# Patient Record
Sex: FEMALE | Race: BLACK OR AFRICAN AMERICAN | NOT HISPANIC OR LATINO | Employment: FULL TIME | URBAN - METROPOLITAN AREA
[De-identification: names, ages, dates, MRNs, and addresses within clinical notes are randomized per-mention and may not be internally consistent; named-entity substitution may affect disease eponyms.]

---

## 2021-01-06 ENCOUNTER — TELEPHONE (OUTPATIENT)
Dept: NEUROLOGY | Facility: CLINIC | Age: 36
End: 2021-01-06

## 2021-01-14 ENCOUNTER — OFFICE VISIT (OUTPATIENT)
Dept: NEUROLOGY | Facility: CLINIC | Age: 36
End: 2021-01-14
Payer: OTHER GOVERNMENT

## 2021-01-14 ENCOUNTER — LAB VISIT (OUTPATIENT)
Dept: LAB | Facility: HOSPITAL | Age: 36
End: 2021-01-14
Attending: PSYCHIATRY & NEUROLOGY
Payer: OTHER GOVERNMENT

## 2021-01-14 VITALS
SYSTOLIC BLOOD PRESSURE: 115 MMHG | BODY MASS INDEX: 33.76 KG/M2 | TEMPERATURE: 97 F | WEIGHT: 227.94 LBS | HEIGHT: 69 IN | DIASTOLIC BLOOD PRESSURE: 79 MMHG | HEART RATE: 77 BPM

## 2021-01-14 DIAGNOSIS — M62.81 MUSCLE WEAKNESS: ICD-10-CM

## 2021-01-14 DIAGNOSIS — M79.10 MYALGIA: ICD-10-CM

## 2021-01-14 DIAGNOSIS — G43.919 INTRACTABLE MIGRAINE WITHOUT STATUS MIGRAINOSUS, UNSPECIFIED MIGRAINE TYPE: ICD-10-CM

## 2021-01-14 DIAGNOSIS — H47.10 OPTIC NERVE EDEMA: ICD-10-CM

## 2021-01-14 DIAGNOSIS — M62.81 MUSCLE WEAKNESS: Primary | ICD-10-CM

## 2021-01-14 LAB
BASOPHILS # BLD AUTO: 0.03 K/UL (ref 0–0.2)
BASOPHILS NFR BLD: 0.5 % (ref 0–1.9)
DIFFERENTIAL METHOD: ABNORMAL
EOSINOPHIL # BLD AUTO: 0.1 K/UL (ref 0–0.5)
EOSINOPHIL NFR BLD: 0.9 % (ref 0–8)
ERYTHROCYTE [DISTWIDTH] IN BLOOD BY AUTOMATED COUNT: 12.5 % (ref 11.5–14.5)
ERYTHROCYTE [SEDIMENTATION RATE] IN BLOOD BY WESTERGREN METHOD: 46 MM/HR (ref 0–20)
HCT VFR BLD AUTO: 44 % (ref 37–48.5)
HGB BLD-MCNC: 13.8 G/DL (ref 12–16)
IMM GRANULOCYTES # BLD AUTO: 0.02 K/UL (ref 0–0.04)
IMM GRANULOCYTES NFR BLD AUTO: 0.4 % (ref 0–0.5)
LYMPHOCYTES # BLD AUTO: 2.1 K/UL (ref 1–4.8)
LYMPHOCYTES NFR BLD: 37.2 % (ref 18–48)
MCH RBC QN AUTO: 31.7 PG (ref 27–31)
MCHC RBC AUTO-ENTMCNC: 31.4 G/DL (ref 32–36)
MCV RBC AUTO: 101 FL (ref 82–98)
MONOCYTES # BLD AUTO: 0.5 K/UL (ref 0.3–1)
MONOCYTES NFR BLD: 7.9 % (ref 4–15)
NEUTROPHILS # BLD AUTO: 3 K/UL (ref 1.8–7.7)
NEUTROPHILS NFR BLD: 53.1 % (ref 38–73)
NRBC BLD-RTO: 0 /100 WBC
PLATELET # BLD AUTO: 339 K/UL (ref 150–350)
PMV BLD AUTO: 10.3 FL (ref 9.2–12.9)
RBC # BLD AUTO: 4.36 M/UL (ref 4–5.4)
WBC # BLD AUTO: 5.67 K/UL (ref 3.9–12.7)

## 2021-01-14 PROCEDURE — 86038 ANTINUCLEAR ANTIBODIES: CPT

## 2021-01-14 PROCEDURE — 84165 PROTEIN E-PHORESIS SERUM: CPT | Mod: 26,,, | Performed by: PATHOLOGY

## 2021-01-14 PROCEDURE — 84165 PATHOLOGIST INTERPRETATION SPE: ICD-10-PCS | Mod: 26,,, | Performed by: PATHOLOGY

## 2021-01-14 PROCEDURE — 85025 COMPLETE CBC W/AUTO DIFF WBC: CPT

## 2021-01-14 PROCEDURE — 99205 PR OFFICE/OUTPT VISIT, NEW, LEVL V, 60-74 MIN: ICD-10-PCS | Mod: S$PBB,,, | Performed by: PSYCHIATRY & NEUROLOGY

## 2021-01-14 PROCEDURE — 82607 VITAMIN B-12: CPT

## 2021-01-14 PROCEDURE — 99999 PR PBB SHADOW E&M-EST. PATIENT-LVL IV: ICD-10-PCS | Mod: PBBFAC,,, | Performed by: PSYCHIATRY & NEUROLOGY

## 2021-01-14 PROCEDURE — 84165 PROTEIN E-PHORESIS SERUM: CPT

## 2021-01-14 PROCEDURE — 84207 ASSAY OF VITAMIN B-6: CPT

## 2021-01-14 PROCEDURE — 36415 COLL VENOUS BLD VENIPUNCTURE: CPT | Mod: PO

## 2021-01-14 PROCEDURE — 82306 VITAMIN D 25 HYDROXY: CPT

## 2021-01-14 PROCEDURE — 84425 ASSAY OF VITAMIN B-1: CPT

## 2021-01-14 PROCEDURE — 82550 ASSAY OF CK (CPK): CPT

## 2021-01-14 PROCEDURE — 99214 OFFICE O/P EST MOD 30 MIN: CPT | Mod: PBBFAC,PN | Performed by: PSYCHIATRY & NEUROLOGY

## 2021-01-14 PROCEDURE — 86255 FLUORESCENT ANTIBODY SCREEN: CPT | Mod: 59

## 2021-01-14 PROCEDURE — 86334 IMMUNOFIX E-PHORESIS SERUM: CPT

## 2021-01-14 PROCEDURE — 82746 ASSAY OF FOLIC ACID SERUM: CPT

## 2021-01-14 PROCEDURE — 86334 PATHOLOGIST INTERPRETATION IFE: ICD-10-PCS | Mod: 26,,, | Performed by: PATHOLOGY

## 2021-01-14 PROCEDURE — 86334 IMMUNOFIX E-PHORESIS SERUM: CPT | Mod: 26,,, | Performed by: PATHOLOGY

## 2021-01-14 PROCEDURE — 99205 OFFICE O/P NEW HI 60 MIN: CPT | Mod: S$PBB,,, | Performed by: PSYCHIATRY & NEUROLOGY

## 2021-01-14 PROCEDURE — 86140 C-REACTIVE PROTEIN: CPT

## 2021-01-14 PROCEDURE — 85651 RBC SED RATE NONAUTOMATED: CPT | Mod: PO

## 2021-01-14 PROCEDURE — 86255 FLUORESCENT ANTIBODY SCREEN: CPT | Mod: 91

## 2021-01-14 PROCEDURE — 83519 RIA NONANTIBODY: CPT | Mod: 59

## 2021-01-14 PROCEDURE — 99999 PR PBB SHADOW E&M-EST. PATIENT-LVL IV: CPT | Mod: PBBFAC,,, | Performed by: PSYCHIATRY & NEUROLOGY

## 2021-01-14 PROCEDURE — 80053 COMPREHEN METABOLIC PANEL: CPT

## 2021-01-14 RX ORDER — LEVETIRACETAM 250 MG/1
250 TABLET ORAL 2 TIMES DAILY
COMMUNITY
Start: 2021-01-05 | End: 2022-03-24

## 2021-01-15 LAB
25(OH)D3+25(OH)D2 SERPL-MCNC: 10 NG/ML (ref 30–96)
ALBUMIN SERPL BCP-MCNC: 3.9 G/DL (ref 3.5–5.2)
ALBUMIN SERPL ELPH-MCNC: 3.92 G/DL (ref 3.35–5.55)
ALP SERPL-CCNC: 105 U/L (ref 55–135)
ALPHA1 GLOB SERPL ELPH-MCNC: 0.4 G/DL (ref 0.17–0.41)
ALPHA2 GLOB SERPL ELPH-MCNC: 1.09 G/DL (ref 0.43–0.99)
ALT SERPL W/O P-5'-P-CCNC: 16 U/L (ref 10–44)
ANA SER QL IF: NORMAL
ANION GAP SERPL CALC-SCNC: 12 MMOL/L (ref 8–16)
AST SERPL-CCNC: 23 U/L (ref 10–40)
B-GLOBULIN SERPL ELPH-MCNC: 1.05 G/DL (ref 0.5–1.1)
BILIRUB SERPL-MCNC: 0.4 MG/DL (ref 0.1–1)
BUN SERPL-MCNC: 8 MG/DL (ref 6–20)
CALCIUM SERPL-MCNC: 9 MG/DL (ref 8.7–10.5)
CHLORIDE SERPL-SCNC: 103 MMOL/L (ref 95–110)
CK SERPL-CCNC: 100 U/L (ref 20–180)
CO2 SERPL-SCNC: 24 MMOL/L (ref 23–29)
CREAT SERPL-MCNC: 0.8 MG/DL (ref 0.5–1.4)
CRP SERPL-MCNC: 10.4 MG/L (ref 0–8.2)
EST. GFR  (AFRICAN AMERICAN): >60 ML/MIN/1.73 M^2
EST. GFR  (NON AFRICAN AMERICAN): >60 ML/MIN/1.73 M^2
FOLATE SERPL-MCNC: 9.1 NG/ML (ref 4–24)
GAMMA GLOB SERPL ELPH-MCNC: 1.85 G/DL (ref 0.67–1.58)
GLUCOSE SERPL-MCNC: 83 MG/DL (ref 70–110)
INTERPRETATION SERPL IFE-IMP: NORMAL
PATHOLOGIST INTERPRETATION IFE: NORMAL
PATHOLOGIST INTERPRETATION SPE: NORMAL
POTASSIUM SERPL-SCNC: 4.1 MMOL/L (ref 3.5–5.1)
PROT SERPL-MCNC: 8.3 G/DL (ref 6–8.4)
PROT SERPL-MCNC: 8.6 G/DL (ref 6–8.4)
SODIUM SERPL-SCNC: 139 MMOL/L (ref 136–145)
VIT B12 SERPL-MCNC: 617 PG/ML (ref 210–950)

## 2021-01-18 LAB
ANCA AB TITR SER IF: NORMAL TITER
P-ANCA TITR SER IF: NORMAL TITER

## 2021-01-19 ENCOUNTER — PATIENT MESSAGE (OUTPATIENT)
Dept: NEUROLOGY | Facility: CLINIC | Age: 36
End: 2021-01-19

## 2021-01-19 LAB
PYRIDOXAL SERPL-MCNC: 29 UG/L (ref 5–50)
VIT B1 BLD-MCNC: 41 UG/L (ref 38–122)

## 2021-01-26 LAB
AMPHIPHYSIN AB TITR SER: NEGATIVE TITER
CV2 IGG TITR SER: NEGATIVE TITER
GLIAL NUC TYPE 1 AB TITR SER: NEGATIVE TITER
HU1 AB TITR SER: NEGATIVE TITER
HU2 AB TITR SER IF: NEGATIVE TITER
HU3 AB TITR SER: NEGATIVE TITER
IMMUNOLOGIST REVIEW: ABNORMAL
NACHR AB SER-SCNC: 0.01 NMOL/L
PAVAL REFLEX TEST ADDED: ABNORMAL
PCA-1 AB TITR SER: NEGATIVE TITER
PCA-2 AB TITR SER: NEGATIVE TITER
PCA-TR AB TITR SER: NEGATIVE TITER
STRIA MUS AB TITR SER: NEGATIVE TITER
VGCC-N BIND AB SER-SCNC: 0.02 NMOL/L
VGCC-P/Q BIND AB SER-SCNC: 0.04 NMOL/L
VGKC AB SER-SCNC: 0 NMOL/L

## 2021-01-29 ENCOUNTER — PROCEDURE VISIT (OUTPATIENT)
Dept: NEUROLOGY | Facility: CLINIC | Age: 36
End: 2021-01-29
Payer: OTHER GOVERNMENT

## 2021-01-29 DIAGNOSIS — M62.81 MUSCLE WEAKNESS: ICD-10-CM

## 2021-01-29 DIAGNOSIS — M79.10 MYALGIA: ICD-10-CM

## 2021-01-29 PROCEDURE — 95910 NRV CNDJ TEST 7-8 STUDIES: CPT | Mod: PBBFAC,PO | Performed by: PSYCHIATRY & NEUROLOGY

## 2021-01-29 PROCEDURE — 95937 NEUROMUSCULAR JUNCTION TEST: CPT | Mod: PBBFAC,PO | Performed by: PSYCHIATRY & NEUROLOGY

## 2021-01-29 PROCEDURE — 95886 MUSC TEST DONE W/N TEST COMP: CPT | Mod: PBBFAC,PO | Performed by: PSYCHIATRY & NEUROLOGY

## 2021-01-29 PROCEDURE — 95910 PR NERVE CONDUCTION STUDY; 7-8 STUDIES: ICD-10-PCS | Mod: 26,S$PBB,, | Performed by: PSYCHIATRY & NEUROLOGY

## 2021-01-29 PROCEDURE — 95886 PR EMG COMPLETE, W/ NERVE CONDUCTION STUDIES, 5+ MUSCLES: ICD-10-PCS | Mod: 26,S$PBB,, | Performed by: PSYCHIATRY & NEUROLOGY

## 2021-01-29 PROCEDURE — 95910 NRV CNDJ TEST 7-8 STUDIES: CPT | Mod: 26,S$PBB,, | Performed by: PSYCHIATRY & NEUROLOGY

## 2021-01-29 PROCEDURE — 95937 PR NEUROMUSCULAR JUNCTION TEST: ICD-10-PCS | Mod: 26,S$PBB,, | Performed by: PSYCHIATRY & NEUROLOGY

## 2021-01-29 PROCEDURE — 95886 MUSC TEST DONE W/N TEST COMP: CPT | Mod: 26,S$PBB,, | Performed by: PSYCHIATRY & NEUROLOGY

## 2021-01-29 PROCEDURE — 95937 NEUROMUSCULAR JUNCTION TEST: CPT | Mod: 26,S$PBB,, | Performed by: PSYCHIATRY & NEUROLOGY

## 2021-02-12 ENCOUNTER — PATIENT MESSAGE (OUTPATIENT)
Dept: NEUROLOGY | Facility: CLINIC | Age: 36
End: 2021-02-12

## 2021-02-23 ENCOUNTER — PATIENT MESSAGE (OUTPATIENT)
Dept: NEUROLOGY | Facility: CLINIC | Age: 36
End: 2021-02-23

## 2021-03-31 ENCOUNTER — LAB VISIT (OUTPATIENT)
Dept: LAB | Facility: HOSPITAL | Age: 36
End: 2021-03-31
Attending: PSYCHIATRY & NEUROLOGY
Payer: OTHER GOVERNMENT

## 2021-03-31 ENCOUNTER — OFFICE VISIT (OUTPATIENT)
Dept: NEUROLOGY | Facility: CLINIC | Age: 36
End: 2021-03-31
Payer: OTHER GOVERNMENT

## 2021-03-31 VITALS
DIASTOLIC BLOOD PRESSURE: 75 MMHG | HEIGHT: 69 IN | HEART RATE: 79 BPM | BODY MASS INDEX: 33.46 KG/M2 | SYSTOLIC BLOOD PRESSURE: 106 MMHG | WEIGHT: 225.88 LBS

## 2021-03-31 DIAGNOSIS — M79.10 MYALGIA: ICD-10-CM

## 2021-03-31 DIAGNOSIS — M79.10 MYALGIA: Primary | ICD-10-CM

## 2021-03-31 LAB — ERYTHROCYTE [SEDIMENTATION RATE] IN BLOOD BY WESTERGREN METHOD: 59 MM/HR (ref 0–20)

## 2021-03-31 PROCEDURE — 99999 PR PBB SHADOW E&M-EST. PATIENT-LVL III: ICD-10-PCS | Mod: PBBFAC,,, | Performed by: PSYCHIATRY & NEUROLOGY

## 2021-03-31 PROCEDURE — 83516 IMMUNOASSAY NONANTIBODY: CPT | Mod: 59 | Performed by: PSYCHIATRY & NEUROLOGY

## 2021-03-31 PROCEDURE — 36415 COLL VENOUS BLD VENIPUNCTURE: CPT | Mod: PO | Performed by: PSYCHIATRY & NEUROLOGY

## 2021-03-31 PROCEDURE — 99214 OFFICE O/P EST MOD 30 MIN: CPT | Mod: S$PBB,,, | Performed by: PSYCHIATRY & NEUROLOGY

## 2021-03-31 PROCEDURE — 86140 C-REACTIVE PROTEIN: CPT | Performed by: PSYCHIATRY & NEUROLOGY

## 2021-03-31 PROCEDURE — 99214 PR OFFICE/OUTPT VISIT, EST, LEVL IV, 30-39 MIN: ICD-10-PCS | Mod: S$PBB,,, | Performed by: PSYCHIATRY & NEUROLOGY

## 2021-03-31 PROCEDURE — 99999 PR PBB SHADOW E&M-EST. PATIENT-LVL III: CPT | Mod: PBBFAC,,, | Performed by: PSYCHIATRY & NEUROLOGY

## 2021-03-31 PROCEDURE — 99213 OFFICE O/P EST LOW 20 MIN: CPT | Mod: PBBFAC,PO | Performed by: PSYCHIATRY & NEUROLOGY

## 2021-03-31 PROCEDURE — 82550 ASSAY OF CK (CPK): CPT | Performed by: PSYCHIATRY & NEUROLOGY

## 2021-03-31 PROCEDURE — 82085 ASSAY OF ALDOLASE: CPT | Performed by: PSYCHIATRY & NEUROLOGY

## 2021-03-31 PROCEDURE — 83519 RIA NONANTIBODY: CPT | Performed by: PSYCHIATRY & NEUROLOGY

## 2021-03-31 PROCEDURE — 85651 RBC SED RATE NONAUTOMATED: CPT | Mod: PO | Performed by: PSYCHIATRY & NEUROLOGY

## 2021-03-31 RX ORDER — SUMATRIPTAN 50 MG/1
50 TABLET, FILM COATED ORAL
COMMUNITY
Start: 2021-03-24 | End: 2022-03-24

## 2021-04-01 LAB
CK SERPL-CCNC: 360 U/L (ref 20–180)
CRP SERPL-MCNC: 10.5 MG/L (ref 0–8.2)

## 2021-04-02 LAB — ALDOLASE SERPL-CCNC: 5.1 U/L (ref 1.2–7.6)

## 2021-04-10 LAB
EJ AB SER QL: NOT DETECTED
ENA JO1 AB SER IA-ACNC: NORMAL AI
KU AB SER QL: NOT DETECTED
MI2 AB SER QL: NOT DETECTED
OJ AB SER QL: NOT DETECTED
PL12 AB SER QL: NOT DETECTED
PL7 AB SER QL: NOT DETECTED
SRP AB SERPL QL: NOT DETECTED

## 2021-04-22 LAB — MAYO MISCELLANEOUS RESULT (REF): NORMAL

## 2021-04-27 ENCOUNTER — PATIENT MESSAGE (OUTPATIENT)
Dept: NEUROLOGY | Facility: CLINIC | Age: 36
End: 2021-04-27

## 2021-05-04 ENCOUNTER — PATIENT MESSAGE (OUTPATIENT)
Dept: NEUROLOGY | Facility: CLINIC | Age: 36
End: 2021-05-04

## 2021-05-17 ENCOUNTER — TELEPHONE (OUTPATIENT)
Dept: NEUROLOGY | Facility: CLINIC | Age: 36
End: 2021-05-17

## 2021-05-20 ENCOUNTER — TELEPHONE (OUTPATIENT)
Dept: NEUROLOGY | Facility: CLINIC | Age: 36
End: 2021-05-20

## 2021-07-01 ENCOUNTER — PATIENT MESSAGE (OUTPATIENT)
Dept: NEUROLOGY | Facility: CLINIC | Age: 36
End: 2021-07-01

## 2021-11-18 ENCOUNTER — LAB VISIT (OUTPATIENT)
Dept: LAB | Facility: HOSPITAL | Age: 36
End: 2021-11-18
Attending: INTERNAL MEDICINE
Payer: OTHER GOVERNMENT

## 2021-11-18 ENCOUNTER — OFFICE VISIT (OUTPATIENT)
Dept: RHEUMATOLOGY | Facility: CLINIC | Age: 36
End: 2021-11-18
Payer: OTHER GOVERNMENT

## 2021-11-18 VITALS
HEART RATE: 66 BPM | RESPIRATION RATE: 18 BRPM | TEMPERATURE: 98 F | OXYGEN SATURATION: 98 % | SYSTOLIC BLOOD PRESSURE: 120 MMHG | DIASTOLIC BLOOD PRESSURE: 79 MMHG

## 2021-11-18 DIAGNOSIS — M79.7 FIBROMYALGIA: ICD-10-CM

## 2021-11-18 DIAGNOSIS — R76.8 POSITIVE ANA (ANTINUCLEAR ANTIBODY): Primary | ICD-10-CM

## 2021-11-18 DIAGNOSIS — Z71.89 COUNSELING AND COORDINATION OF CARE: ICD-10-CM

## 2021-11-18 DIAGNOSIS — R76.8 POSITIVE ANA (ANTINUCLEAR ANTIBODY): ICD-10-CM

## 2021-11-18 DIAGNOSIS — E66.9 CLASS 1 OBESITY IN ADULT, UNSPECIFIED BMI, UNSPECIFIED OBESITY TYPE, UNSPECIFIED WHETHER SERIOUS COMORBIDITY PRESENT: ICD-10-CM

## 2021-11-18 LAB
25(OH)D3+25(OH)D2 SERPL-MCNC: 19 NG/ML (ref 30–96)
ALBUMIN SERPL BCP-MCNC: 3.7 G/DL (ref 3.5–5.2)
ALP SERPL-CCNC: 87 U/L (ref 55–135)
ALT SERPL W/O P-5'-P-CCNC: 18 U/L (ref 10–44)
ANION GAP SERPL CALC-SCNC: 9 MMOL/L (ref 8–16)
AST SERPL-CCNC: 26 U/L (ref 10–40)
BASOPHILS # BLD AUTO: 0.02 K/UL (ref 0–0.2)
BASOPHILS NFR BLD: 0.4 % (ref 0–1.9)
BILIRUB SERPL-MCNC: 0.4 MG/DL (ref 0.1–1)
BUN SERPL-MCNC: 7 MG/DL (ref 6–20)
C3 SERPL-MCNC: 150 MG/DL (ref 50–180)
C4 SERPL-MCNC: 30 MG/DL (ref 11–44)
CALCIUM SERPL-MCNC: 9.4 MG/DL (ref 8.7–10.5)
CCP AB SER IA-ACNC: <0.5 U/ML
CHLORIDE SERPL-SCNC: 103 MMOL/L (ref 95–110)
CK SERPL-CCNC: 95 U/L (ref 20–180)
CO2 SERPL-SCNC: 25 MMOL/L (ref 23–29)
CREAT SERPL-MCNC: 0.8 MG/DL (ref 0.5–1.4)
CRP SERPL-MCNC: 5.3 MG/L (ref 0–8.2)
DIFFERENTIAL METHOD: ABNORMAL
EOSINOPHIL # BLD AUTO: 0.1 K/UL (ref 0–0.5)
EOSINOPHIL NFR BLD: 0.9 % (ref 0–8)
ERYTHROCYTE [DISTWIDTH] IN BLOOD BY AUTOMATED COUNT: 12.5 % (ref 11.5–14.5)
ERYTHROCYTE [SEDIMENTATION RATE] IN BLOOD BY WESTERGREN METHOD: 45 MM/HR (ref 0–36)
EST. GFR  (AFRICAN AMERICAN): >60 ML/MIN/1.73 M^2
EST. GFR  (NON AFRICAN AMERICAN): >60 ML/MIN/1.73 M^2
GLUCOSE SERPL-MCNC: 70 MG/DL (ref 70–110)
HCT VFR BLD AUTO: 42.6 % (ref 37–48.5)
HGB BLD-MCNC: 13.5 G/DL (ref 12–16)
IMM GRANULOCYTES # BLD AUTO: 0.01 K/UL (ref 0–0.04)
IMM GRANULOCYTES NFR BLD AUTO: 0.2 % (ref 0–0.5)
LDH SERPL L TO P-CCNC: 197 U/L (ref 110–260)
LYMPHOCYTES # BLD AUTO: 2.1 K/UL (ref 1–4.8)
LYMPHOCYTES NFR BLD: 40.3 % (ref 18–48)
MCH RBC QN AUTO: 31.2 PG (ref 27–31)
MCHC RBC AUTO-ENTMCNC: 31.7 G/DL (ref 32–36)
MCV RBC AUTO: 98 FL (ref 82–98)
MONOCYTES # BLD AUTO: 0.4 K/UL (ref 0.3–1)
MONOCYTES NFR BLD: 8.3 % (ref 4–15)
NEUTROPHILS # BLD AUTO: 2.7 K/UL (ref 1.8–7.7)
NEUTROPHILS NFR BLD: 49.9 % (ref 38–73)
NRBC BLD-RTO: 0 /100 WBC
PLATELET # BLD AUTO: 337 K/UL (ref 150–450)
PMV BLD AUTO: 10.4 FL (ref 9.2–12.9)
POTASSIUM SERPL-SCNC: 3.5 MMOL/L (ref 3.5–5.1)
PROT SERPL-MCNC: 8.6 G/DL (ref 6–8.4)
RBC # BLD AUTO: 4.33 M/UL (ref 4–5.4)
RHEUMATOID FACT SERPL-ACNC: <13 IU/ML (ref 0–15)
SODIUM SERPL-SCNC: 137 MMOL/L (ref 136–145)
TSH SERPL DL<=0.005 MIU/L-ACNC: 1.11 UIU/ML (ref 0.4–4)
WBC # BLD AUTO: 5.31 K/UL (ref 3.9–12.7)

## 2021-11-18 PROCEDURE — 99213 OFFICE O/P EST LOW 20 MIN: CPT | Mod: PBBFAC,PO | Performed by: INTERNAL MEDICINE

## 2021-11-18 PROCEDURE — 83615 LACTATE (LD) (LDH) ENZYME: CPT | Performed by: INTERNAL MEDICINE

## 2021-11-18 PROCEDURE — 82306 VITAMIN D 25 HYDROXY: CPT | Performed by: INTERNAL MEDICINE

## 2021-11-18 PROCEDURE — 82550 ASSAY OF CK (CPK): CPT | Performed by: INTERNAL MEDICINE

## 2021-11-18 PROCEDURE — 86235 NUCLEAR ANTIGEN ANTIBODY: CPT | Mod: 59 | Performed by: INTERNAL MEDICINE

## 2021-11-18 PROCEDURE — 85025 COMPLETE CBC W/AUTO DIFF WBC: CPT | Performed by: INTERNAL MEDICINE

## 2021-11-18 PROCEDURE — 86038 ANTINUCLEAR ANTIBODIES: CPT | Performed by: INTERNAL MEDICINE

## 2021-11-18 PROCEDURE — 82085 ASSAY OF ALDOLASE: CPT | Performed by: INTERNAL MEDICINE

## 2021-11-18 PROCEDURE — 86235 NUCLEAR ANTIGEN ANTIBODY: CPT | Performed by: INTERNAL MEDICINE

## 2021-11-18 PROCEDURE — 86160 COMPLEMENT ANTIGEN: CPT | Mod: 59 | Performed by: INTERNAL MEDICINE

## 2021-11-18 PROCEDURE — 83516 IMMUNOASSAY NONANTIBODY: CPT | Mod: 59 | Performed by: INTERNAL MEDICINE

## 2021-11-18 PROCEDURE — 87340 HEPATITIS B SURFACE AG IA: CPT | Performed by: INTERNAL MEDICINE

## 2021-11-18 PROCEDURE — 86706 HEP B SURFACE ANTIBODY: CPT | Performed by: INTERNAL MEDICINE

## 2021-11-18 PROCEDURE — 86200 CCP ANTIBODY: CPT | Performed by: INTERNAL MEDICINE

## 2021-11-18 PROCEDURE — 99204 PR OFFICE/OUTPT VISIT, NEW, LEVL IV, 45-59 MIN: ICD-10-PCS | Mod: S$PBB,,, | Performed by: INTERNAL MEDICINE

## 2021-11-18 PROCEDURE — 85652 RBC SED RATE AUTOMATED: CPT | Performed by: INTERNAL MEDICINE

## 2021-11-18 PROCEDURE — 80053 COMPREHEN METABOLIC PANEL: CPT | Performed by: INTERNAL MEDICINE

## 2021-11-18 PROCEDURE — 87389 HIV-1 AG W/HIV-1&-2 AB AG IA: CPT | Performed by: INTERNAL MEDICINE

## 2021-11-18 PROCEDURE — 83520 IMMUNOASSAY QUANT NOS NONAB: CPT | Mod: 59 | Performed by: INTERNAL MEDICINE

## 2021-11-18 PROCEDURE — 86431 RHEUMATOID FACTOR QUANT: CPT | Performed by: INTERNAL MEDICINE

## 2021-11-18 PROCEDURE — 99204 OFFICE O/P NEW MOD 45 MIN: CPT | Mod: S$PBB,,, | Performed by: INTERNAL MEDICINE

## 2021-11-18 PROCEDURE — 99999 PR PBB SHADOW E&M-EST. PATIENT-LVL III: CPT | Mod: PBBFAC,,, | Performed by: INTERNAL MEDICINE

## 2021-11-18 PROCEDURE — 86160 COMPLEMENT ANTIGEN: CPT | Performed by: INTERNAL MEDICINE

## 2021-11-18 PROCEDURE — 99999 PR PBB SHADOW E&M-EST. PATIENT-LVL III: ICD-10-PCS | Mod: PBBFAC,,, | Performed by: INTERNAL MEDICINE

## 2021-11-18 PROCEDURE — 86803 HEPATITIS C AB TEST: CPT | Performed by: INTERNAL MEDICINE

## 2021-11-18 PROCEDURE — 84443 ASSAY THYROID STIM HORMONE: CPT | Performed by: INTERNAL MEDICINE

## 2021-11-18 PROCEDURE — 86140 C-REACTIVE PROTEIN: CPT | Performed by: INTERNAL MEDICINE

## 2021-11-19 LAB
ALDOLASE SERPL-CCNC: 2.8 U/L (ref 1.2–7.6)
ANA SER QL IF: NORMAL
HBV SURFACE AB SER-ACNC: POSITIVE M[IU]/ML
HBV SURFACE AG SERPL QL IA: NEGATIVE
HCV AB SERPL QL IA: NEGATIVE
HIV 1+2 AB+HIV1 P24 AG SERPL QL IA: NEGATIVE

## 2021-11-22 LAB
ANTI-SSA ANTIBODY: 0.25 RATIO (ref 0–0.99)
ANTI-SSA INTERPRETATION: NEGATIVE
ANTI-SSB ANTIBODY: 0.29 RATIO (ref 0–0.99)
ANTI-SSB INTERPRETATION: NEGATIVE
ENA SCL70 AB SER-ACNC: 8 UNITS
HISTONE IGG SER IA-ACNC: 2 UNITS (ref 0–0.9)

## 2021-12-02 LAB
ANTI-PM/SCL AB: 51 UNITS
ANTI-SS-A 52 KD AB, IGG: <20 UNITS
EJ AB SER QL: NEGATIVE
ENA JO1 AB SER IA-ACNC: <20 UNITS
ENA SM+RNP AB SER IA-ACNC: <20 UNITS
FIBRILLARIN (U3 RNP): NEGATIVE
KU AB SER QL: NEGATIVE
MDA-5 (P140): <20 UNITS
MI2 AB SER QL: NEGATIVE
NXP-2 (P140): <20 UNITS
OJ AB SER QL: NEGATIVE
PL12 AB SER QL: NEGATIVE
PL7 AB SER QL: NEGATIVE
SRP AB SERPL QL: NEGATIVE
TIF1 GAMMA (P155/140): <20 UNITS
U2 SNRNP: NEGATIVE

## 2021-12-16 ENCOUNTER — OFFICE VISIT (OUTPATIENT)
Dept: RHEUMATOLOGY | Facility: CLINIC | Age: 36
End: 2021-12-16
Payer: OTHER GOVERNMENT

## 2021-12-16 DIAGNOSIS — Z71.89 COUNSELING AND COORDINATION OF CARE: ICD-10-CM

## 2021-12-16 DIAGNOSIS — M79.7 FIBROMYALGIA: ICD-10-CM

## 2021-12-16 DIAGNOSIS — R76.9 ABNORMAL IMMUNOLOGICAL FINDING IN SERUM: Primary | ICD-10-CM

## 2021-12-16 DIAGNOSIS — Z79.899 ENCOUNTER FOR LONG-TERM (CURRENT) USE OF OTHER MEDICATIONS: ICD-10-CM

## 2021-12-16 DIAGNOSIS — E66.9 CLASS 1 OBESITY IN ADULT, UNSPECIFIED BMI, UNSPECIFIED OBESITY TYPE, UNSPECIFIED WHETHER SERIOUS COMORBIDITY PRESENT: ICD-10-CM

## 2021-12-16 PROCEDURE — 99214 PR OFFICE/OUTPT VISIT, EST, LEVL IV, 30-39 MIN: ICD-10-PCS | Mod: 95,,, | Performed by: INTERNAL MEDICINE

## 2021-12-16 PROCEDURE — 99214 OFFICE O/P EST MOD 30 MIN: CPT | Mod: 95,,, | Performed by: INTERNAL MEDICINE

## 2021-12-16 RX ORDER — METHYLPREDNISOLONE 4 MG/1
TABLET ORAL
Qty: 1 EACH | Refills: 0 | Status: SHIPPED | OUTPATIENT
Start: 2021-12-16 | End: 2022-02-18

## 2021-12-16 RX ORDER — HYDROXYCHLOROQUINE SULFATE 200 MG/1
200 TABLET, FILM COATED ORAL 2 TIMES DAILY
Qty: 60 TABLET | Refills: 6 | Status: SHIPPED | OUTPATIENT
Start: 2021-12-16 | End: 2022-05-09 | Stop reason: SDUPTHER

## 2022-01-25 ENCOUNTER — PATIENT MESSAGE (OUTPATIENT)
Dept: RHEUMATOLOGY | Facility: CLINIC | Age: 37
End: 2022-01-25
Payer: OTHER GOVERNMENT

## 2022-01-27 ENCOUNTER — PATIENT MESSAGE (OUTPATIENT)
Dept: RHEUMATOLOGY | Facility: CLINIC | Age: 37
End: 2022-01-27
Payer: OTHER GOVERNMENT

## 2022-02-17 ENCOUNTER — PATIENT MESSAGE (OUTPATIENT)
Dept: RHEUMATOLOGY | Facility: CLINIC | Age: 37
End: 2022-02-17
Payer: OTHER GOVERNMENT

## 2022-02-18 ENCOUNTER — PATIENT MESSAGE (OUTPATIENT)
Dept: RHEUMATOLOGY | Facility: CLINIC | Age: 37
End: 2022-02-18
Payer: OTHER GOVERNMENT

## 2022-02-18 DIAGNOSIS — M60.9 MYOSITIS, UNSPECIFIED MYOSITIS TYPE, UNSPECIFIED SITE: Primary | ICD-10-CM

## 2022-02-18 RX ORDER — METHYLPREDNISOLONE 4 MG/1
TABLET ORAL
Qty: 1 EACH | Refills: 0 | Status: SHIPPED | OUTPATIENT
Start: 2022-02-18 | End: 2022-11-14 | Stop reason: ALTCHOICE

## 2022-03-02 ENCOUNTER — LAB VISIT (OUTPATIENT)
Dept: LAB | Facility: CLINIC | Age: 37
End: 2022-03-02
Payer: OTHER GOVERNMENT

## 2022-03-02 DIAGNOSIS — M60.9 MYOSITIS, UNSPECIFIED MYOSITIS TYPE, UNSPECIFIED SITE: ICD-10-CM

## 2022-03-02 LAB
ALBUMIN SERPL BCP-MCNC: 3.3 G/DL (ref 3.5–5.2)
ALP SERPL-CCNC: 87 U/L (ref 55–135)
ALT SERPL W/O P-5'-P-CCNC: 20 U/L (ref 10–44)
ANION GAP SERPL CALC-SCNC: 11 MMOL/L (ref 8–16)
AST SERPL-CCNC: 27 U/L (ref 10–40)
BASOPHILS # BLD AUTO: 0.04 K/UL (ref 0–0.2)
BASOPHILS NFR BLD: 0.7 % (ref 0–1.9)
BILIRUB SERPL-MCNC: 0.4 MG/DL (ref 0.1–1)
BUN SERPL-MCNC: 8 MG/DL (ref 6–20)
CALCIUM SERPL-MCNC: 8.6 MG/DL (ref 8.7–10.5)
CHLORIDE SERPL-SCNC: 103 MMOL/L (ref 95–110)
CK SERPL-CCNC: 107 U/L (ref 20–180)
CO2 SERPL-SCNC: 23 MMOL/L (ref 23–29)
CREAT SERPL-MCNC: 0.8 MG/DL (ref 0.5–1.4)
CRP SERPL-MCNC: 22.4 MG/L (ref 0–8.2)
DIFFERENTIAL METHOD: ABNORMAL
EOSINOPHIL # BLD AUTO: 0.2 K/UL (ref 0–0.5)
EOSINOPHIL NFR BLD: 2.8 % (ref 0–8)
ERYTHROCYTE [DISTWIDTH] IN BLOOD BY AUTOMATED COUNT: 12.6 % (ref 11.5–14.5)
ERYTHROCYTE [SEDIMENTATION RATE] IN BLOOD BY WESTERGREN METHOD: 61 MM/HR (ref 0–20)
EST. GFR  (AFRICAN AMERICAN): >60 ML/MIN/1.73 M^2
EST. GFR  (NON AFRICAN AMERICAN): >60 ML/MIN/1.73 M^2
GLUCOSE SERPL-MCNC: 94 MG/DL (ref 70–110)
HCT VFR BLD AUTO: 38.8 % (ref 37–48.5)
HGB BLD-MCNC: 12.9 G/DL (ref 12–16)
IMM GRANULOCYTES # BLD AUTO: 0.02 K/UL (ref 0–0.04)
IMM GRANULOCYTES NFR BLD AUTO: 0.3 % (ref 0–0.5)
LYMPHOCYTES # BLD AUTO: 1.8 K/UL (ref 1–4.8)
LYMPHOCYTES NFR BLD: 31.9 % (ref 18–48)
MCH RBC QN AUTO: 31.6 PG (ref 27–31)
MCHC RBC AUTO-ENTMCNC: 33.2 G/DL (ref 32–36)
MCV RBC AUTO: 95 FL (ref 82–98)
MONOCYTES # BLD AUTO: 0.6 K/UL (ref 0.3–1)
MONOCYTES NFR BLD: 10.5 % (ref 4–15)
NEUTROPHILS # BLD AUTO: 3.1 K/UL (ref 1.8–7.7)
NEUTROPHILS NFR BLD: 53.8 % (ref 38–73)
NRBC BLD-RTO: 0 /100 WBC
PLATELET # BLD AUTO: 308 K/UL (ref 150–450)
PMV BLD AUTO: 9.9 FL (ref 9.2–12.9)
POTASSIUM SERPL-SCNC: 4.2 MMOL/L (ref 3.5–5.1)
PROT SERPL-MCNC: 7.3 G/DL (ref 6–8.4)
RBC # BLD AUTO: 4.08 M/UL (ref 4–5.4)
SODIUM SERPL-SCNC: 137 MMOL/L (ref 136–145)
TSH SERPL DL<=0.005 MIU/L-ACNC: 1.78 UIU/ML (ref 0.4–4)
WBC # BLD AUTO: 5.74 K/UL (ref 3.9–12.7)

## 2022-03-02 PROCEDURE — 82550 ASSAY OF CK (CPK): CPT | Performed by: INTERNAL MEDICINE

## 2022-03-02 PROCEDURE — 84443 ASSAY THYROID STIM HORMONE: CPT | Performed by: INTERNAL MEDICINE

## 2022-03-02 PROCEDURE — 80053 COMPREHEN METABOLIC PANEL: CPT | Performed by: INTERNAL MEDICINE

## 2022-03-02 PROCEDURE — 36415 COLL VENOUS BLD VENIPUNCTURE: CPT | Mod: ,,, | Performed by: INTERNAL MEDICINE

## 2022-03-02 PROCEDURE — 82085 ASSAY OF ALDOLASE: CPT | Performed by: INTERNAL MEDICINE

## 2022-03-02 PROCEDURE — 86140 C-REACTIVE PROTEIN: CPT | Performed by: INTERNAL MEDICINE

## 2022-03-02 PROCEDURE — 36415 PR COLLECTION VENOUS BLOOD,VENIPUNCTURE: ICD-10-PCS | Mod: ,,, | Performed by: INTERNAL MEDICINE

## 2022-03-02 PROCEDURE — 85025 COMPLETE CBC W/AUTO DIFF WBC: CPT | Performed by: INTERNAL MEDICINE

## 2022-03-02 PROCEDURE — 85651 RBC SED RATE NONAUTOMATED: CPT | Performed by: INTERNAL MEDICINE

## 2022-03-04 ENCOUNTER — PATIENT MESSAGE (OUTPATIENT)
Dept: RHEUMATOLOGY | Facility: CLINIC | Age: 37
End: 2022-03-04
Payer: OTHER GOVERNMENT

## 2022-03-07 LAB — ALDOLASE SERPL-CCNC: 3.5 U/L (ref 1.2–7.6)

## 2022-03-07 RX ORDER — OXYCODONE AND ACETAMINOPHEN 5; 325 MG/1; MG/1
1 TABLET ORAL EVERY 4 HOURS PRN
Qty: 20 TABLET | Refills: 0 | Status: SHIPPED | OUTPATIENT
Start: 2022-03-07 | End: 2022-08-03

## 2022-03-24 ENCOUNTER — OFFICE VISIT (OUTPATIENT)
Dept: RHEUMATOLOGY | Facility: CLINIC | Age: 37
End: 2022-03-24
Payer: OTHER GOVERNMENT

## 2022-03-24 VITALS
WEIGHT: 246.25 LBS | SYSTOLIC BLOOD PRESSURE: 113 MMHG | HEART RATE: 80 BPM | BODY MASS INDEX: 36.37 KG/M2 | DIASTOLIC BLOOD PRESSURE: 74 MMHG

## 2022-03-24 DIAGNOSIS — R76.8 SCL-70 ANTIBODY POSITIVE: Primary | ICD-10-CM

## 2022-03-24 DIAGNOSIS — Z79.52 LONG TERM SYSTEMIC STEROID USER: ICD-10-CM

## 2022-03-24 DIAGNOSIS — Z71.89 COUNSELING AND COORDINATION OF CARE: ICD-10-CM

## 2022-03-24 DIAGNOSIS — E66.9 CLASS 2 OBESITY WITH BODY MASS INDEX (BMI) OF 36.0 TO 36.9 IN ADULT, UNSPECIFIED OBESITY TYPE, UNSPECIFIED WHETHER SERIOUS COMORBIDITY PRESENT: ICD-10-CM

## 2022-03-24 DIAGNOSIS — M79.7 FIBROMYALGIA: ICD-10-CM

## 2022-03-24 DIAGNOSIS — R70.0 ELEVATED SED RATE: ICD-10-CM

## 2022-03-24 PROCEDURE — 99999 PR PBB SHADOW E&M-EST. PATIENT-LVL III: CPT | Mod: PBBFAC,,, | Performed by: INTERNAL MEDICINE

## 2022-03-24 PROCEDURE — 99214 PR OFFICE/OUTPT VISIT, EST, LEVL IV, 30-39 MIN: ICD-10-PCS | Mod: S$PBB,,, | Performed by: INTERNAL MEDICINE

## 2022-03-24 PROCEDURE — 99999 PR PBB SHADOW E&M-EST. PATIENT-LVL III: ICD-10-PCS | Mod: PBBFAC,,, | Performed by: INTERNAL MEDICINE

## 2022-03-24 PROCEDURE — 99213 OFFICE O/P EST LOW 20 MIN: CPT | Mod: PBBFAC,PO | Performed by: INTERNAL MEDICINE

## 2022-03-24 PROCEDURE — 99214 OFFICE O/P EST MOD 30 MIN: CPT | Mod: S$PBB,,, | Performed by: INTERNAL MEDICINE

## 2022-03-24 RX ORDER — GABAPENTIN 300 MG/1
300 CAPSULE ORAL NIGHTLY
Qty: 30 CAPSULE | Refills: 11 | Status: SHIPPED | OUTPATIENT
Start: 2022-03-24 | End: 2022-05-09 | Stop reason: SDUPTHER

## 2022-03-24 RX ORDER — AMITRIPTYLINE HYDROCHLORIDE 25 MG/1
TABLET, FILM COATED ORAL
COMMUNITY
Start: 2021-11-11 | End: 2022-05-09 | Stop reason: SDUPTHER

## 2022-03-24 RX ORDER — PREDNISONE 10 MG/1
TABLET ORAL
Qty: 53 TABLET | Refills: 0 | Status: SHIPPED | OUTPATIENT
Start: 2022-03-24 | End: 2022-04-18

## 2022-03-24 RX ORDER — ERENUMAB-AOOE 70 MG/ML
INJECTION SUBCUTANEOUS
COMMUNITY
Start: 2021-05-19 | End: 2022-08-24
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
## 2022-05-09 ENCOUNTER — OFFICE VISIT (OUTPATIENT)
Dept: RHEUMATOLOGY | Facility: CLINIC | Age: 37
End: 2022-05-09
Payer: OTHER GOVERNMENT

## 2022-05-09 DIAGNOSIS — Z79.899 ENCOUNTER FOR LONG-TERM (CURRENT) USE OF OTHER MEDICATIONS: ICD-10-CM

## 2022-05-09 DIAGNOSIS — E66.9 OBESITY, UNSPECIFIED CLASSIFICATION, UNSPECIFIED OBESITY TYPE, UNSPECIFIED WHETHER SERIOUS COMORBIDITY PRESENT: ICD-10-CM

## 2022-05-09 DIAGNOSIS — J84.9 INTERSTITIAL PULMONARY DISEASE, UNSPECIFIED: ICD-10-CM

## 2022-05-09 DIAGNOSIS — R76.9 ABNORMAL IMMUNOLOGICAL FINDING IN SERUM: ICD-10-CM

## 2022-05-09 DIAGNOSIS — Z79.1 NSAID LONG-TERM USE: ICD-10-CM

## 2022-05-09 DIAGNOSIS — M79.7 FIBROMYALGIA: Primary | ICD-10-CM

## 2022-05-09 DIAGNOSIS — Z71.89 COUNSELING AND COORDINATION OF CARE: ICD-10-CM

## 2022-05-09 PROCEDURE — 99214 OFFICE O/P EST MOD 30 MIN: CPT | Mod: 95,,, | Performed by: INTERNAL MEDICINE

## 2022-05-09 PROCEDURE — 99214 PR OFFICE/OUTPT VISIT, EST, LEVL IV, 30-39 MIN: ICD-10-PCS | Mod: 95,,, | Performed by: INTERNAL MEDICINE

## 2022-05-09 RX ORDER — GABAPENTIN 300 MG/1
600 CAPSULE ORAL NIGHTLY
Qty: 60 CAPSULE | Refills: 11 | Status: SHIPPED | OUTPATIENT
Start: 2022-05-09 | End: 2023-06-07

## 2022-05-09 RX ORDER — HYDROXYCHLOROQUINE SULFATE 200 MG/1
200 TABLET, FILM COATED ORAL 2 TIMES DAILY
Qty: 60 TABLET | Refills: 6 | Status: SHIPPED | OUTPATIENT
Start: 2022-05-09 | End: 2023-09-06

## 2022-05-09 RX ORDER — CELECOXIB 200 MG/1
200 CAPSULE ORAL 2 TIMES DAILY
Qty: 60 CAPSULE | Refills: 3 | Status: SHIPPED | OUTPATIENT
Start: 2022-05-09 | End: 2023-06-07

## 2022-05-09 RX ORDER — AMITRIPTYLINE HYDROCHLORIDE 25 MG/1
25 TABLET, FILM COATED ORAL NIGHTLY
Qty: 30 TABLET | Refills: 3 | Status: SHIPPED | OUTPATIENT
Start: 2022-05-09 | End: 2023-06-07
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
## 2022-06-03 ENCOUNTER — HOSPITAL ENCOUNTER (OUTPATIENT)
Dept: RADIOLOGY | Facility: HOSPITAL | Age: 37
Discharge: HOME | End: 2022-06-03
Attending: INTERNAL MEDICINE
Payer: OTHER GOVERNMENT

## 2022-06-03 DIAGNOSIS — J84.9 INTERSTITIAL PULMONARY DISEASE, UNSPECIFIED: ICD-10-CM

## 2022-06-03 PROCEDURE — 71250 CT THORAX DX C-: CPT | Mod: 26,,, | Performed by: RADIOLOGY

## 2022-06-03 PROCEDURE — 71250 CT THORAX DX C-: CPT | Mod: TC

## 2022-06-03 PROCEDURE — 71250 CT CHEST WITHOUT CONTRAST: ICD-10-PCS | Mod: 26,,, | Performed by: RADIOLOGY

## 2022-06-06 ENCOUNTER — PATIENT MESSAGE (OUTPATIENT)
Dept: RHEUMATOLOGY | Facility: CLINIC | Age: 37
End: 2022-06-06
Payer: OTHER GOVERNMENT

## 2022-06-06 DIAGNOSIS — R06.02 SOB (SHORTNESS OF BREATH): Primary | ICD-10-CM

## 2022-06-29 ENCOUNTER — OFFICE VISIT (OUTPATIENT)
Dept: PULMONOLOGY | Facility: CLINIC | Age: 37
End: 2022-06-29
Payer: OTHER GOVERNMENT

## 2022-06-29 VITALS
DIASTOLIC BLOOD PRESSURE: 74 MMHG | SYSTOLIC BLOOD PRESSURE: 106 MMHG | HEIGHT: 69 IN | OXYGEN SATURATION: 98 % | WEIGHT: 248.44 LBS | BODY MASS INDEX: 36.8 KG/M2 | HEART RATE: 89 BPM

## 2022-06-29 DIAGNOSIS — R06.02 SOB (SHORTNESS OF BREATH): Primary | ICD-10-CM

## 2022-06-29 DIAGNOSIS — M33.20 POLYMYOSITIS: ICD-10-CM

## 2022-06-29 PROCEDURE — 99999 PR PBB SHADOW E&M-EST. PATIENT-LVL IV: ICD-10-PCS | Mod: PBBFAC,,, | Performed by: NURSE PRACTITIONER

## 2022-06-29 PROCEDURE — 99214 OFFICE O/P EST MOD 30 MIN: CPT | Mod: PBBFAC,PO | Performed by: NURSE PRACTITIONER

## 2022-06-29 PROCEDURE — 99999 PR PBB SHADOW E&M-EST. PATIENT-LVL IV: CPT | Mod: PBBFAC,,, | Performed by: NURSE PRACTITIONER

## 2022-06-29 PROCEDURE — 99204 PR OFFICE/OUTPT VISIT, NEW, LEVL IV, 45-59 MIN: ICD-10-PCS | Mod: S$PBB,,, | Performed by: NURSE PRACTITIONER

## 2022-06-29 PROCEDURE — 99204 OFFICE O/P NEW MOD 45 MIN: CPT | Mod: S$PBB,,, | Performed by: NURSE PRACTITIONER

## 2022-06-29 RX ORDER — FLUTICASONE FUROATE, UMECLIDINIUM BROMIDE AND VILANTEROL TRIFENATATE 200; 62.5; 25 UG/1; UG/1; UG/1
1 POWDER RESPIRATORY (INHALATION) DAILY
Qty: 60 EACH | Refills: 11 | Status: SHIPPED | OUTPATIENT
Start: 2022-06-29 | End: 2022-08-10

## 2022-06-29 RX ORDER — FREMANEZUMAB-VFRM 225 MG/1.5ML
INJECTION SUBCUTANEOUS
COMMUNITY
Start: 2022-06-02 | End: 2023-10-16 | Stop reason: ALTCHOICE

## 2022-06-29 RX ORDER — ALBUTEROL SULFATE 90 UG/1
2 AEROSOL, METERED RESPIRATORY (INHALATION) EVERY 4 HOURS PRN
Qty: 18 G | Refills: 11 | Status: SHIPPED | OUTPATIENT
Start: 2022-06-29 | End: 2022-11-14 | Stop reason: ALTCHOICE

## 2022-06-29 NOTE — PATIENT INSTRUCTIONS
Ordering lung function test, six minute walk to evaluate lung strength    Start 2 medications to help with shortness of breath  Trelegy 200 1 puff once a day every day, rinse mouth after using due to risk for thrush if mouth or tongue has white sores contact clinic    Albuterol Inhaler 1-2 puffs every 4 hours, for cough or shortness of breath

## 2022-06-29 NOTE — PROGRESS NOTES
6/29/2022    Kiki Werner  New Patient Consult    Chief Complaint   Patient presents with    Shortness of Breath    Wheezing       HPI: 6/29/2022- referred by Rheumatologist Dr. Conley. Dx Polymyositis. On Pl[place]enill therapy, previously treated with systemic steroid with no benefit.  Positive for sleep apnea, wearing CPAP occasionally.     Sudden onset of shortness of breath that occurs at random, worse with exertion, improves with 5 minutes of rest. 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 Associated with chest tightness.   Complaint of wheeze when laying down at night. Has nocturnal awakens with SOB around 2-3 am 1 time weekly.        Social Hx: lives with family no pets, currently working as , no known Asbestosis exposure, no Smoking Hx  Family Hx: no Lung Cancer, no COPD, no Asthma  Medical Hx: no previous pneumonia ; no previous shoulder/chest surgery      The chief compliant  problem is new to me  PFSH:  Past Medical History:   Diagnosis Date    Headache     Memory loss          No past surgical history on file.  Social History     Tobacco Use    Smoking status: Never Smoker    Smokeless tobacco: Never Used   Substance Use Topics    Alcohol use: Yes     Alcohol/week: 1.0 standard drink     Types: 1 Glasses of wine per week    Drug use: Never     Family History   Problem Relation Age of Onset    Cancer Mother     Heart disease Mother     Migraines Mother     Neuropathy Mother     Diabetes Father     Hyperlipidemia Father     Migraines Father     Neuropathy Father     Stroke Paternal Grandmother      Review of patient's allergies indicates:   Allergen Reactions    Penicillins      Other reaction(s): Unknown reaction  As a Baby       I have reviewed past medical, family, and social history. I have reviewed previous nurse notes.    Performance Status:The patient's activity level is functions out of house.      Review of Systems:  a review of eleven  "systems covering constitutional, Eye, HEENT, Psych, Respiratory, Cardiac, GI, , Musculoskeletal, Endocrine, Dermatologic was negative except for pertinent findings as listed ABOVE and below: pertinent positive as above, rest is good  apnea, chest tightness, wheeze, shortness of breath  palpitations and leg swelling   gait problem, myalgias    headaches              Exam:Comprehensive exam done. /74 (BP Location: Left arm, Patient Position: Sitting, BP Method: Medium (Automatic))   Pulse 89   Ht 5' 9" (1.753 m)   Wt 112.7 kg (248 lb 7.3 oz)   SpO2 98% Comment: on room air at rest  BMI 36.69 kg/m²   Exam included Vitals as listed, and patient's appearance and affect and alertness and mood, oral exam for yeast and hygiene and pharynx lesions and Mallapatti (M) score, neck with inspection for jvd and masses and thyroid abnormalities and lymph nodes (supraclavicular and infraclavicular nodes and axillary also examined and noted if abn), chest exam included symmetry and effort and fremitus and percussion and auscultation, cardiac exam included rhythm and gallops and murmur and rubs and jvd and edema, abdominal exam for mass and hepatosplenomegaly and tenderness and hernias and bowel sounds, Musculoskeletal exam with muscle tone and posture and mobility/gait and  strength, and skin for rashes and cyanosis and pallor and turgor, extremity for clubbing.  Findings were normal except for pertinent findings listed below:   M2, BS clear    Radiographs (ct chest and cxr) reviewed: view by direct vision   CT Chest Without Contrast  06/03/2022  calcified mediastinal lymph nodes tiny calcified granuloma in the right lower lobe       Labs reviewed       Lab Results   Component Value Date    WBC 5.74 03/02/2022    RBC 4.08 03/02/2022    HGB 12.9 03/02/2022    HCT 38.8 03/02/2022    MCV 95 03/02/2022    MCH 31.6 (H) 03/02/2022    MCHC 33.2 03/02/2022    RDW 12.6 03/02/2022     03/02/2022    MPV 9.9 03/02/2022    " GRAN 3.1 03/02/2022    GRAN 53.8 03/02/2022    LYMPH 1.8 03/02/2022    LYMPH 31.9 03/02/2022    MONO 0.6 03/02/2022    MONO 10.5 03/02/2022    EOS 0.2 03/02/2022    BASO 0.04 03/02/2022    EOSINOPHIL 2.8 03/02/2022    BASOPHIL 0.7 03/02/2022      Latest Reference Range & Units 11/18/21 11:04   KAYLA Screen Negative <1:80  Negative <1:80   Anti-SSA Antibody 0.00 - 0.99 Ratio 0.25   Anti-SSA Interpretation Negative  Negative   Anti-SSB Antibody 0.00 - 0.99 Ratio 0.29   Anti-SSB Interpretation Negative  Negative   Anti-Histone Antibody 0.0 - 0.9 Units 2.0 (H)   Scleroderma SCL- <20 UNITS 8   Complement (C-3) 50 - 180 mg/dL 150   Complement (C-4) 11 - 44 mg/dL 30   Anti-Maty-1 Antibody <20 Units <20   Anti-PM/Scl Ab <20 Units 51 (H)   Anti-SS-A 52 kD Ab, IgG <20 Units <20   Anti-U1-RNP  Ab <20 Units <20   CCP Antibodies <5.0 U/mL <0.5         PFT will be done and results to be reviewed  Pulmonary Functions Testing Results:        Plan:  Clinical impression is resonably certain and repeated evaluation prn +/- follow up will be needed as below.    Kiki was seen today for shortness of breath and wheezing.    Diagnoses and all orders for this visit:    SOB (shortness of breath)  -     Ambulatory referral/consult to Pulmonology  -     Complete PFT with bronchodilator; Future  -     Six Minute Walk Test to qualify for Home Oxygen; Future  -     albuterol (VENTOLIN HFA) 90 mcg/actuation inhaler; Inhale 2 puffs into the lungs every 4 (four) hours as needed for Shortness of Breath. Rescue  -     fluticasone-umeclidin-vilanter (TRELEGY ELLIPTA) 200-62.5-25 mcg inhaler; Inhale 1 puff into the lungs once daily.    Polymyositis     - continue to follow with Rheumatology    Follow up in about 6 weeks (around 8/10/2022), or if symptoms worsen or fail to improve.    Discussed with patient above for education the following:      Patient Instructions   Ordering lung function test, six minute walk to evaluate lung strength    Start 2  medications to help with shortness of breath  Trelegy 200 1 puff once a day every day, rinse mouth after using due to risk for thrush if mouth or tongue has white sores contact clinic    Albuterol Inhaler 1-2 puffs every 4 hours, for cough or shortness of breath

## 2022-06-30 ENCOUNTER — PATIENT MESSAGE (OUTPATIENT)
Dept: RHEUMATOLOGY | Facility: CLINIC | Age: 37
End: 2022-06-30
Payer: OTHER GOVERNMENT

## 2022-07-14 ENCOUNTER — PATIENT MESSAGE (OUTPATIENT)
Dept: PULMONOLOGY | Facility: CLINIC | Age: 37
End: 2022-07-14

## 2022-07-14 ENCOUNTER — HOSPITAL ENCOUNTER (OUTPATIENT)
Dept: PULMONOLOGY | Facility: HOSPITAL | Age: 37
Discharge: HOME | End: 2022-07-14
Payer: OTHER GOVERNMENT

## 2022-07-14 DIAGNOSIS — R06.02 SOB (SHORTNESS OF BREATH): ICD-10-CM

## 2022-07-14 DIAGNOSIS — M33.21 POLYMYOSITIS WITH RESPIRATORY INVOLVEMENT: Primary | ICD-10-CM

## 2022-07-14 LAB
BR6MWT: NORMAL
BRPFT: NORMAL
DLCO ADJ PRE: 20.08 ML/(MIN*MMHG)
DLCO SINGLE BREATH LLN: 23.56
DLCO SINGLE BREATH PRE REF: 68.6 %
DLCO SINGLE BREATH REF: 29.3
DLCOC SBVA LLN: 3.76
DLCOC SBVA PRE REF: 103.5 %
DLCOC SBVA REF: 5.09
DLCOC SINGLE BREATH LLN: 23.56
DLCOC SINGLE BREATH PRE REF: 68.6 %
DLCOC SINGLE BREATH REF: 29.3
DLCOVA LLN: 3.76
DLCOVA PRE REF: 103.5 %
DLCOVA PRE: 5.27 ML/(MIN*MMHG*L)
DLCOVA REF: 5.09
DLVAADJ PRE: 5.27 ML/(MIN*MMHG*L)
ERVN2 LLN: -16448.79
ERVN2 PRE REF: 63.2 %
ERVN2 PRE: 0.77 L
ERVN2 REF: 1.21
FEF 25 75 CHG: 5.1 %
FEF 25 75 LLN: 1.79
FEF 25 75 POST REF: 105.4 %
FEF 25 75 PRE REF: 100.2 %
FEF 25 75 REF: 3.24
FET100 CHG: 7.1 %
FEV1 CHG: -0.4 %
FEV1 FVC CHG: 1 %
FEV1 FVC LLN: 72
FEV1 FVC POST REF: 99.5 %
FEV1 FVC PRE REF: 98.6 %
FEV1 FVC REF: 83
FEV1 LLN: 2.42
FEV1 POST REF: 103.4 %
FEV1 PRE REF: 103.8 %
FEV1 REF: 3.11
FRCN2 LLN: 2.13
FRCN2 PRE REF: 73.7 %
FRCN2 REF: 2.96
FVC CHG: -1.3 %
FVC LLN: 2.96
FVC POST REF: 103.3 %
FVC PRE REF: 104.7 %
FVC REF: 3.78
IVC PRE: 2.64 L
IVC SINGLE BREATH LLN: 2.96
IVC SINGLE BREATH PRE REF: 69.9 %
IVC SINGLE BREATH REF: 3.78
MVV LLN: 116
MVV PRE REF: 82.1 %
MVV REF: 136
PEF CHG: 3.9 %
PEF LLN: 5.23
PEF POST REF: 97.6 %
PEF PRE REF: 93.9 %
PEF REF: 7.55
POST FEF 25 75: 3.41 L/S
POST FET 100: 7.5 SEC
POST FEV1 FVC: 82.53 %
POST FEV1: 3.22 L
POST FVC: 3.9 L
POST PEF: 7.38 L/S
PRE DLCO: 20.08 ML/(MIN*MMHG)
PRE FEF 25 75: 3.24 L/S
PRE FET 100: 7.01 SEC
PRE FEV1 FVC: 81.75 %
PRE FEV1: 3.23 L
PRE FRC N2: 2.18 L
PRE FVC: 3.95 L
PRE MVV: 112 L/MIN
PRE PEF: 7.1 L/S
RVN2 LLN: 1.17
RVN2 PRE REF: 31.7 %
RVN2 PRE: 0.55 L
RVN2 REF: 1.74
RVN2TLCN2 LLN: 21.61
RVN2TLCN2 PRE REF: 39.4 %
RVN2TLCN2 PRE: 12.28 %
RVN2TLCN2 REF: 31.2
TLCN2 LLN: 4.77
TLCN2 PRE REF: 78.2 %
TLCN2 PRE: 4.5 L
TLCN2 REF: 5.76
VA PRE: 3.81 L
VA SINGLE BREATH LLN: 5.61
VA SINGLE BREATH PRE REF: 68 %
VA SINGLE BREATH REF: 5.61
VCMAXN2 LLN: 2.96
VCMAXN2 PRE REF: 104.7 %
VCMAXN2 PRE: 3.95 L
VCMAXN2 REF: 3.78

## 2022-07-14 PROCEDURE — 94729 DIFFUSING CAPACITY: CPT

## 2022-07-14 PROCEDURE — 94618 PULMONARY STRESS TESTING: CPT

## 2022-07-14 PROCEDURE — 94729 PR C02/MEMBANE DIFFUSE CAPACITY: ICD-10-PCS | Mod: 26,,, | Performed by: INTERNAL MEDICINE

## 2022-07-14 PROCEDURE — 94060 EVALUATION OF WHEEZING: CPT | Mod: 26,59,, | Performed by: INTERNAL MEDICINE

## 2022-07-14 PROCEDURE — 94618 PULMONARY STRESS TESTING: ICD-10-PCS | Mod: 26,,, | Performed by: INTERNAL MEDICINE

## 2022-07-14 PROCEDURE — 94727 GAS DIL/WSHOT DETER LNG VOL: CPT

## 2022-07-14 PROCEDURE — 94727 GAS DIL/WSHOT DETER LNG VOL: CPT | Mod: 26,,, | Performed by: INTERNAL MEDICINE

## 2022-07-14 PROCEDURE — 94727 PR PULM FUNCTION TEST BY GAS: ICD-10-PCS | Mod: 26,,, | Performed by: INTERNAL MEDICINE

## 2022-07-14 PROCEDURE — 94729 DIFFUSING CAPACITY: CPT | Mod: 26,,, | Performed by: INTERNAL MEDICINE

## 2022-07-14 PROCEDURE — 94060 EVALUATION OF WHEEZING: CPT

## 2022-07-14 PROCEDURE — 94618 PULMONARY STRESS TESTING: CPT | Mod: 26,,, | Performed by: INTERNAL MEDICINE

## 2022-07-14 PROCEDURE — 94060 PR EVAL OF BRONCHOSPASM: ICD-10-PCS | Mod: 26,59,, | Performed by: INTERNAL MEDICINE

## 2022-07-15 ENCOUNTER — TELEPHONE (OUTPATIENT)
Dept: PULMONOLOGY | Facility: CLINIC | Age: 37
End: 2022-07-15
Payer: OTHER GOVERNMENT

## 2022-07-22 ENCOUNTER — TELEPHONE (OUTPATIENT)
Dept: PULMONOLOGY | Facility: CLINIC | Age: 37
End: 2022-07-22
Payer: OTHER GOVERNMENT

## 2022-07-22 DIAGNOSIS — R06.02 SHORTNESS OF BREATH: Primary | ICD-10-CM

## 2022-07-27 ENCOUNTER — PATIENT MESSAGE (OUTPATIENT)
Dept: PULMONOLOGY | Facility: CLINIC | Age: 37
End: 2022-07-27
Payer: OTHER GOVERNMENT

## 2022-07-27 ENCOUNTER — LAB VISIT (OUTPATIENT)
Dept: LAB | Facility: HOSPITAL | Age: 37
End: 2022-07-27
Attending: INTERNAL MEDICINE
Payer: OTHER GOVERNMENT

## 2022-07-27 ENCOUNTER — OFFICE VISIT (OUTPATIENT)
Dept: RHEUMATOLOGY | Facility: CLINIC | Age: 37
End: 2022-07-27
Attending: INTERNAL MEDICINE
Payer: OTHER GOVERNMENT

## 2022-07-27 VITALS
BODY MASS INDEX: 36.8 KG/M2 | OXYGEN SATURATION: 99 % | SYSTOLIC BLOOD PRESSURE: 138 MMHG | HEART RATE: 84 BPM | HEIGHT: 69 IN | DIASTOLIC BLOOD PRESSURE: 85 MMHG | WEIGHT: 248.44 LBS | RESPIRATION RATE: 20 BRPM

## 2022-07-27 DIAGNOSIS — Z79.899 ENCOUNTER FOR LONG-TERM (CURRENT) USE OF OTHER MEDICATIONS: ICD-10-CM

## 2022-07-27 DIAGNOSIS — Z79.1 NSAID LONG-TERM USE: ICD-10-CM

## 2022-07-27 DIAGNOSIS — M60.9 MYOSITIS, UNSPECIFIED MYOSITIS TYPE, UNSPECIFIED SITE: ICD-10-CM

## 2022-07-27 DIAGNOSIS — E66.01 MORBID OBESITY: ICD-10-CM

## 2022-07-27 DIAGNOSIS — M60.9 MYOSITIS, UNSPECIFIED MYOSITIS TYPE, UNSPECIFIED SITE: Primary | ICD-10-CM

## 2022-07-27 DIAGNOSIS — M79.7 FIBROMYALGIA: ICD-10-CM

## 2022-07-27 DIAGNOSIS — Z71.89 COUNSELING AND COORDINATION OF CARE: ICD-10-CM

## 2022-07-27 LAB
ALBUMIN SERPL BCP-MCNC: 3.5 G/DL (ref 3.5–5.2)
ALP SERPL-CCNC: 85 U/L (ref 55–135)
ALT SERPL W/O P-5'-P-CCNC: 17 U/L (ref 10–44)
ANION GAP SERPL CALC-SCNC: 10 MMOL/L (ref 8–16)
AST SERPL-CCNC: 24 U/L (ref 10–40)
B-HCG UR QL: NEGATIVE
BASOPHILS # BLD AUTO: 0.02 K/UL (ref 0–0.2)
BASOPHILS NFR BLD: 0.4 % (ref 0–1.9)
BILIRUB SERPL-MCNC: 0.5 MG/DL (ref 0.1–1)
BUN SERPL-MCNC: 8 MG/DL (ref 6–20)
CALCIUM SERPL-MCNC: 9 MG/DL (ref 8.7–10.5)
CHLORIDE SERPL-SCNC: 103 MMOL/L (ref 95–110)
CK SERPL-CCNC: 125 U/L (ref 20–180)
CO2 SERPL-SCNC: 26 MMOL/L (ref 23–29)
CREAT SERPL-MCNC: 0.8 MG/DL (ref 0.5–1.4)
CRP SERPL-MCNC: 14.9 MG/L (ref 0–8.2)
CTP QC/QA: YES
DIFFERENTIAL METHOD: NORMAL
EOSINOPHIL # BLD AUTO: 0.1 K/UL (ref 0–0.5)
EOSINOPHIL NFR BLD: 1.1 % (ref 0–8)
ERYTHROCYTE [DISTWIDTH] IN BLOOD BY AUTOMATED COUNT: 12.8 % (ref 11.5–14.5)
ERYTHROCYTE [SEDIMENTATION RATE] IN BLOOD BY WESTERGREN METHOD: 75 MM/HR (ref 0–20)
EST. GFR  (AFRICAN AMERICAN): >60 ML/MIN/1.73 M^2
EST. GFR  (NON AFRICAN AMERICAN): >60 ML/MIN/1.73 M^2
GLUCOSE SERPL-MCNC: 90 MG/DL (ref 70–110)
HCT VFR BLD AUTO: 38.7 % (ref 37–48.5)
HGB BLD-MCNC: 12.8 G/DL (ref 12–16)
IMM GRANULOCYTES # BLD AUTO: 0.01 K/UL (ref 0–0.04)
IMM GRANULOCYTES NFR BLD AUTO: 0.2 % (ref 0–0.5)
LDH SERPL L TO P-CCNC: 188 U/L (ref 110–260)
LYMPHOCYTES # BLD AUTO: 1.9 K/UL (ref 1–4.8)
LYMPHOCYTES NFR BLD: 35.3 % (ref 18–48)
MCH RBC QN AUTO: 30.6 PG (ref 27–31)
MCHC RBC AUTO-ENTMCNC: 33.1 G/DL (ref 32–36)
MCV RBC AUTO: 93 FL (ref 82–98)
MONOCYTES # BLD AUTO: 0.6 K/UL (ref 0.3–1)
MONOCYTES NFR BLD: 10.1 % (ref 4–15)
NEUTROPHILS # BLD AUTO: 2.9 K/UL (ref 1.8–7.7)
NEUTROPHILS NFR BLD: 52.9 % (ref 38–73)
NRBC BLD-RTO: 0 /100 WBC
PLATELET # BLD AUTO: 314 K/UL (ref 150–450)
PMV BLD AUTO: 10.1 FL (ref 9.2–12.9)
POTASSIUM SERPL-SCNC: 4 MMOL/L (ref 3.5–5.1)
PROT SERPL-MCNC: 8 G/DL (ref 6–8.4)
RBC # BLD AUTO: 4.18 M/UL (ref 4–5.4)
SODIUM SERPL-SCNC: 139 MMOL/L (ref 136–145)
WBC # BLD AUTO: 5.47 K/UL (ref 3.9–12.7)

## 2022-07-27 PROCEDURE — 99999 PR PBB SHADOW E&M-EST. PATIENT-LVL IV: CPT | Mod: PBBFAC,,, | Performed by: INTERNAL MEDICINE

## 2022-07-27 PROCEDURE — 80053 COMPREHEN METABOLIC PANEL: CPT | Performed by: INTERNAL MEDICINE

## 2022-07-27 PROCEDURE — 85652 RBC SED RATE AUTOMATED: CPT | Performed by: INTERNAL MEDICINE

## 2022-07-27 PROCEDURE — 83615 LACTATE (LD) (LDH) ENZYME: CPT | Performed by: INTERNAL MEDICINE

## 2022-07-27 PROCEDURE — 99214 PR OFFICE/OUTPT VISIT, EST, LEVL IV, 30-39 MIN: ICD-10-PCS | Mod: S$PBB,25,, | Performed by: INTERNAL MEDICINE

## 2022-07-27 PROCEDURE — 85025 COMPLETE CBC W/AUTO DIFF WBC: CPT | Performed by: INTERNAL MEDICINE

## 2022-07-27 PROCEDURE — 99214 OFFICE O/P EST MOD 30 MIN: CPT | Mod: S$PBB,25,, | Performed by: INTERNAL MEDICINE

## 2022-07-27 PROCEDURE — 36415 COLL VENOUS BLD VENIPUNCTURE: CPT | Mod: PN | Performed by: INTERNAL MEDICINE

## 2022-07-27 PROCEDURE — 99999 PR PBB SHADOW E&M-EST. PATIENT-LVL IV: ICD-10-PCS | Mod: PBBFAC,,, | Performed by: INTERNAL MEDICINE

## 2022-07-27 PROCEDURE — 86140 C-REACTIVE PROTEIN: CPT | Performed by: INTERNAL MEDICINE

## 2022-07-27 PROCEDURE — 96372 THER/PROPH/DIAG INJ SC/IM: CPT | Mod: PBBFAC | Performed by: INTERNAL MEDICINE

## 2022-07-27 PROCEDURE — 99214 OFFICE O/P EST MOD 30 MIN: CPT | Mod: PBBFAC,25,PN | Performed by: INTERNAL MEDICINE

## 2022-07-27 PROCEDURE — 81025 URINE PREGNANCY TEST: CPT | Mod: PBBFAC,PN | Performed by: INTERNAL MEDICINE

## 2022-07-27 PROCEDURE — 82550 ASSAY OF CK (CPK): CPT | Performed by: INTERNAL MEDICINE

## 2022-07-27 PROCEDURE — 82085 ASSAY OF ALDOLASE: CPT | Performed by: INTERNAL MEDICINE

## 2022-07-27 RX ORDER — METHYLPREDNISOLONE 4 MG/1
TABLET ORAL
Qty: 1 EACH | Refills: 0 | Status: SHIPPED | OUTPATIENT
Start: 2022-07-27 | End: 2022-11-14 | Stop reason: ALTCHOICE

## 2022-07-27 RX ADMIN — METHYLPREDNISOLONE SODIUM SUCCINATE 40 MG: 40 INJECTION, POWDER, LYOPHILIZED, FOR SOLUTION INTRAMUSCULAR; INTRAVENOUS at 09:07
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
## 2022-07-31 LAB — ALDOLASE SERPL-CCNC: 1.5 U/L (ref 1.2–7.6)

## 2022-08-02 ENCOUNTER — PATIENT MESSAGE (OUTPATIENT)
Dept: RHEUMATOLOGY | Facility: CLINIC | Age: 37
End: 2022-08-02
Payer: OTHER GOVERNMENT

## 2022-08-02 DIAGNOSIS — M25.559 HIP PAIN: Primary | ICD-10-CM

## 2022-08-03 ENCOUNTER — PATIENT MESSAGE (OUTPATIENT)
Dept: RHEUMATOLOGY | Facility: CLINIC | Age: 37
End: 2022-08-03
Payer: OTHER GOVERNMENT

## 2022-08-03 RX ORDER — OXYCODONE AND ACETAMINOPHEN 5; 325 MG/1; MG/1
1 TABLET ORAL EVERY 4 HOURS PRN
Qty: 20 TABLET | Refills: 0 | Status: SHIPPED | OUTPATIENT
Start: 2022-08-03 | End: 2023-07-31 | Stop reason: ALTCHOICE

## 2022-08-07 ENCOUNTER — PATIENT MESSAGE (OUTPATIENT)
Dept: PULMONOLOGY | Facility: CLINIC | Age: 37
End: 2022-08-07
Payer: OTHER GOVERNMENT

## 2022-08-08 ENCOUNTER — LAB VISIT (OUTPATIENT)
Dept: LAB | Facility: CLINIC | Age: 37
End: 2022-08-08
Payer: OTHER GOVERNMENT

## 2022-08-08 DIAGNOSIS — R06.02 SHORTNESS OF BREATH: ICD-10-CM

## 2022-08-10 ENCOUNTER — HOSPITAL ENCOUNTER (OUTPATIENT)
Dept: PULMONOLOGY | Facility: HOSPITAL | Age: 37
Discharge: HOME | End: 2022-08-10
Attending: INTERNAL MEDICINE
Payer: OTHER GOVERNMENT

## 2022-08-10 ENCOUNTER — OFFICE VISIT (OUTPATIENT)
Dept: PULMONOLOGY | Facility: CLINIC | Age: 37
End: 2022-08-10
Payer: OTHER GOVERNMENT

## 2022-08-10 VITALS
HEIGHT: 69 IN | OXYGEN SATURATION: 98 % | BODY MASS INDEX: 36.15 KG/M2 | HEART RATE: 82 BPM | SYSTOLIC BLOOD PRESSURE: 104 MMHG | DIASTOLIC BLOOD PRESSURE: 72 MMHG | WEIGHT: 244.06 LBS

## 2022-08-10 DIAGNOSIS — G47.33 OSA (OBSTRUCTIVE SLEEP APNEA): ICD-10-CM

## 2022-08-10 DIAGNOSIS — R06.02 SHORTNESS OF BREATH: Primary | ICD-10-CM

## 2022-08-10 DIAGNOSIS — J96.11 CHRONIC HYPOXEMIC RESPIRATORY FAILURE: ICD-10-CM

## 2022-08-10 DIAGNOSIS — R06.02 SHORTNESS OF BREATH: ICD-10-CM

## 2022-08-10 DIAGNOSIS — M33.20 POLYMYOSITIS: ICD-10-CM

## 2022-08-10 PROCEDURE — 99999 PR PBB SHADOW E&M-EST. PATIENT-LVL IV: ICD-10-PCS | Mod: PBBFAC,,, | Performed by: INTERNAL MEDICINE

## 2022-08-10 PROCEDURE — 99214 OFFICE O/P EST MOD 30 MIN: CPT | Mod: PBBFAC,PO | Performed by: INTERNAL MEDICINE

## 2022-08-10 PROCEDURE — 99214 OFFICE O/P EST MOD 30 MIN: CPT | Mod: 25,S$PBB,, | Performed by: INTERNAL MEDICINE

## 2022-08-10 PROCEDURE — 94010 BREATHING CAPACITY TEST: ICD-10-PCS | Mod: 26,,, | Performed by: INTERNAL MEDICINE

## 2022-08-10 PROCEDURE — 94010 BREATHING CAPACITY TEST: CPT | Mod: 26,,, | Performed by: INTERNAL MEDICINE

## 2022-08-10 PROCEDURE — 99214 PR OFFICE/OUTPT VISIT, EST, LEVL IV, 30-39 MIN: ICD-10-PCS | Mod: 25,S$PBB,, | Performed by: INTERNAL MEDICINE

## 2022-08-10 PROCEDURE — 99999 PR PBB SHADOW E&M-EST. PATIENT-LVL IV: CPT | Mod: PBBFAC,,, | Performed by: INTERNAL MEDICINE

## 2022-08-10 PROCEDURE — 94727 GAS DIL/WSHOT DETER LNG VOL: CPT

## 2022-08-10 NOTE — PATIENT INSTRUCTIONS
Continue home oxygen to keep sats >92%  Echo to check for pulmonary hypertension  Repeat PFT 3 months  Supine/erect breathing test  Continue follow up with rheumatology, neurology
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
## 2022-08-12 ENCOUNTER — TELEPHONE (OUTPATIENT)
Dept: PULMONOLOGY | Facility: CLINIC | Age: 37
End: 2022-08-12
Payer: OTHER GOVERNMENT

## 2022-08-12 ENCOUNTER — PATIENT MESSAGE (OUTPATIENT)
Dept: PULMONOLOGY | Facility: CLINIC | Age: 37
End: 2022-08-12
Payer: OTHER GOVERNMENT

## 2022-08-12 NOTE — TELEPHONE ENCOUNTER
Relayed message via portal    ----- Message from Salome Olivares MD sent at 8/11/2022  4:23 PM CDT -----  Ms. Werner,  Your supine-erect breathing test was normal indicating less likely problem with the nerves or muscles causing breathing trouble.  Dr. Olivares

## 2022-08-24 ENCOUNTER — TELEPHONE (OUTPATIENT)
Dept: RHEUMATOLOGY | Facility: CLINIC | Age: 37
End: 2022-08-24

## 2022-08-24 ENCOUNTER — OFFICE VISIT (OUTPATIENT)
Dept: RHEUMATOLOGY | Facility: CLINIC | Age: 37
End: 2022-08-24
Payer: OTHER GOVERNMENT

## 2022-08-24 VITALS
OXYGEN SATURATION: 98 % | HEART RATE: 64 BPM | WEIGHT: 239.44 LBS | DIASTOLIC BLOOD PRESSURE: 72 MMHG | SYSTOLIC BLOOD PRESSURE: 106 MMHG | BODY MASS INDEX: 35.46 KG/M2 | HEIGHT: 69 IN

## 2022-08-24 DIAGNOSIS — M79.7 FIBROMYALGIA: ICD-10-CM

## 2022-08-24 DIAGNOSIS — R76.9 ABNORMAL IMMUNOLOGICAL FINDING IN SERUM: Primary | ICD-10-CM

## 2022-08-24 DIAGNOSIS — Z79.899 ENCOUNTER FOR LONG-TERM (CURRENT) USE OF OTHER MEDICATIONS: ICD-10-CM

## 2022-08-24 DIAGNOSIS — Z79.1 NSAID LONG-TERM USE: ICD-10-CM

## 2022-08-24 DIAGNOSIS — Z71.89 COUNSELING AND COORDINATION OF CARE: ICD-10-CM

## 2022-08-24 DIAGNOSIS — F32.A DEPRESSION, UNSPECIFIED DEPRESSION TYPE: ICD-10-CM

## 2022-08-24 DIAGNOSIS — E66.01 MORBID OBESITY: ICD-10-CM

## 2022-08-24 PROCEDURE — 99213 OFFICE O/P EST LOW 20 MIN: CPT | Mod: PBBFAC,PN | Performed by: INTERNAL MEDICINE

## 2022-08-24 PROCEDURE — 99215 OFFICE O/P EST HI 40 MIN: CPT | Mod: S$PBB,,, | Performed by: INTERNAL MEDICINE

## 2022-08-24 PROCEDURE — 99999 PR PBB SHADOW E&M-EST. PATIENT-LVL III: ICD-10-PCS | Mod: PBBFAC,,, | Performed by: INTERNAL MEDICINE

## 2022-08-24 PROCEDURE — 99999 PR PBB SHADOW E&M-EST. PATIENT-LVL III: CPT | Mod: PBBFAC,,, | Performed by: INTERNAL MEDICINE

## 2022-08-24 PROCEDURE — 99215 PR OFFICE/OUTPT VISIT, EST, LEVL V, 40-54 MIN: ICD-10-PCS | Mod: S$PBB,,, | Performed by: INTERNAL MEDICINE

## 2022-08-24 RX ORDER — RIMEGEPANT SULFATE 75 MG/75MG
75 TABLET, ORALLY DISINTEGRATING ORAL DAILY PRN
COMMUNITY
Start: 2022-06-06 | End: 2024-02-07

## 2022-08-24 RX ORDER — PREDNISONE 10 MG/1
TABLET ORAL
COMMUNITY
Start: 2022-03-24 | End: 2023-06-07

## 2022-08-24 RX ORDER — RIMEGEPANT SULFATE 75 MG/75MG
TABLET, ORALLY DISINTEGRATING ORAL
COMMUNITY
Start: 2022-06-02
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
## 2022-08-24 NOTE — TELEPHONE ENCOUNTER
----- Message from Misty Rao sent at 8/24/2022  8:34 AM CDT -----  Patient states she will be !5 minutes late due to traffic and wants to know will you still accept her at the appointment.? Please call patient back at [phone].Thanks

## 2022-08-30 ENCOUNTER — PROCEDURE VISIT (OUTPATIENT)
Dept: NEUROLOGY | Facility: CLINIC | Age: 37
End: 2022-08-30
Payer: OTHER GOVERNMENT

## 2022-08-30 VITALS — WEIGHT: 239.44 LBS | HEIGHT: 69 IN | BODY MASS INDEX: 35.46 KG/M2

## 2022-08-30 DIAGNOSIS — M60.9 MYOSITIS, UNSPECIFIED MYOSITIS TYPE, UNSPECIFIED SITE: ICD-10-CM

## 2022-08-30 PROCEDURE — 95911 PR NERVE CONDUCTION STUDY; 9-10 STUDIES: ICD-10-PCS | Mod: 26,S$PBB,, | Performed by: NEUROLOGICAL SURGERY

## 2022-08-30 PROCEDURE — 95886 MUSC TEST DONE W/N TEST COMP: CPT | Mod: PBBFAC | Performed by: NEUROLOGICAL SURGERY

## 2022-08-30 PROCEDURE — 95911 NRV CNDJ TEST 9-10 STUDIES: CPT | Mod: PBBFAC | Performed by: NEUROLOGICAL SURGERY

## 2022-08-30 PROCEDURE — 99214 OFFICE O/P EST MOD 30 MIN: CPT | Mod: S$PBB,,, | Performed by: NEUROLOGICAL SURGERY

## 2022-08-30 PROCEDURE — 95886 PR EMG COMPLETE, W/ NERVE CONDUCTION STUDIES, 5+ MUSCLES: ICD-10-PCS | Mod: 26,S$PBB,, | Performed by: NEUROLOGICAL SURGERY

## 2022-08-30 PROCEDURE — 99214 PR OFFICE/OUTPT VISIT, EST, LEVL IV, 30-39 MIN: ICD-10-PCS | Mod: S$PBB,,, | Performed by: NEUROLOGICAL SURGERY

## 2022-08-30 PROCEDURE — 95911 NRV CNDJ TEST 9-10 STUDIES: CPT | Mod: 26,S$PBB,, | Performed by: NEUROLOGICAL SURGERY

## 2022-08-30 PROCEDURE — 95886 MUSC TEST DONE W/N TEST COMP: CPT | Mod: 26,S$PBB,, | Performed by: NEUROLOGICAL SURGERY

## 2022-09-12 ENCOUNTER — TELEPHONE (OUTPATIENT)
Dept: CARDIOLOGY | Facility: HOSPITAL | Age: 37
End: 2022-09-12

## 2022-09-13 ENCOUNTER — PATIENT MESSAGE (OUTPATIENT)
Dept: RHEUMATOLOGY | Facility: CLINIC | Age: 37
End: 2022-09-13
Payer: OTHER GOVERNMENT

## 2022-09-13 ENCOUNTER — CLINICAL SUPPORT (OUTPATIENT)
Dept: CARDIOLOGY | Facility: HOSPITAL | Age: 37
End: 2022-09-13
Attending: INTERNAL MEDICINE
Payer: OTHER GOVERNMENT

## 2022-09-13 VITALS — BODY MASS INDEX: 35.4 KG/M2 | HEIGHT: 69 IN | WEIGHT: 239 LBS

## 2022-09-13 DIAGNOSIS — R94.131 EMG (ELECTROMYOGRAM) ABNORMALITIES: Primary | ICD-10-CM

## 2022-09-13 DIAGNOSIS — R06.02 SHORTNESS OF BREATH: ICD-10-CM

## 2022-09-13 PROCEDURE — 93306 ECHO (CUPID ONLY): ICD-10-PCS | Mod: 26,,, | Performed by: INTERNAL MEDICINE

## 2022-09-13 PROCEDURE — 93306 TTE W/DOPPLER COMPLETE: CPT | Mod: 26,,, | Performed by: INTERNAL MEDICINE

## 2022-09-13 PROCEDURE — 93306 TTE W/DOPPLER COMPLETE: CPT

## 2022-09-14 LAB
AV INDEX (PROSTH): 0.98
AV MEAN GRADIENT: 3 MMHG
AV VALVE AREA: 3.13 CM2
BSA FOR ECHO PROCEDURE: 2.3 M2
CV ECHO LV RWT: 0.37 CM
DOP CALC AO VTI: 24.31 CM
DOP CALC LVOT AREA: 3.2 CM2
DOP CALC LVOT DIAMETER: 2.02 CM
DOP CALC LVOT PEAK VEL: 104.62 M/S
DOP CALC LVOT STROKE VOLUME: 76.2 CM3
DOP CALCLVOT PEAK VEL VTI: 23.79 CM
E WAVE DECELERATION TIME: 199.28 MSEC
E/A RATIO: 1.09
E/E' RATIO: 5.54 M/S
ECHO LV POSTERIOR WALL: 0.88 CM (ref 0.6–1.1)
EJECTION FRACTION: 65 %
FRACTIONAL SHORTENING: 43 % (ref 28–44)
INTERVENTRICULAR SEPTUM: 0.82 CM (ref 0.6–1.1)
LEFT ATRIUM SIZE: 3.66 CM
LEFT INTERNAL DIMENSION IN SYSTOLE: 2.73 CM (ref 2.1–4)
LEFT VENTRICLE DIASTOLIC VOLUME INDEX: 50.1 ML/M2
LEFT VENTRICLE DIASTOLIC VOLUME: 111.73 ML
LEFT VENTRICLE MASS INDEX: 62 G/M2
LEFT VENTRICLE SYSTOLIC VOLUME INDEX: 9.1 ML/M2
LEFT VENTRICLE SYSTOLIC VOLUME: 20.3 ML
LEFT VENTRICULAR INTERNAL DIMENSION IN DIASTOLE: 4.82 CM (ref 3.5–6)
LEFT VENTRICULAR MASS: 138.04 G
LV LATERAL E/E' RATIO: 4.5 M/S
LV SEPTAL E/E' RATIO: 7.2 M/S
MV PEAK A VEL: 0.66 M/S
MV PEAK E VEL: 0.72 M/S
PISA TR MAX VEL: 2.3 M/S
RA PRESSURE: 3 MMHG
RIGHT VENTRICULAR END-DIASTOLIC DIMENSION: 3.37 CM
TDI LATERAL: 0.16 M/S
TDI SEPTAL: 0.1 M/S
TDI: 0.13 M/S
TR MAX PG: 21 MMHG
TRICUSPID ANNULAR PLANE SYSTOLIC EXCURSION: 2.24 CM
TV REST PULMONARY ARTERY PRESSURE: 24 MMHG

## 2022-10-04 ENCOUNTER — OFFICE VISIT (OUTPATIENT)
Dept: NEUROLOGY | Facility: CLINIC | Age: 37
End: 2022-10-04
Payer: OTHER GOVERNMENT

## 2022-10-04 VITALS
BODY MASS INDEX: 35.3 KG/M2 | SYSTOLIC BLOOD PRESSURE: 116 MMHG | OXYGEN SATURATION: 100 % | WEIGHT: 238.31 LBS | HEART RATE: 76 BPM | DIASTOLIC BLOOD PRESSURE: 68 MMHG | HEIGHT: 69 IN

## 2022-10-04 DIAGNOSIS — R94.131 EMG (ELECTROMYOGRAM) ABNORMALITIES: Primary | ICD-10-CM

## 2022-10-04 DIAGNOSIS — M62.81 MUSCLE WEAKNESS: ICD-10-CM

## 2022-10-04 PROCEDURE — 99215 PR OFFICE/OUTPT VISIT, EST, LEVL V, 40-54 MIN: ICD-10-PCS | Mod: S$PBB,,,

## 2022-10-04 PROCEDURE — 99214 OFFICE O/P EST MOD 30 MIN: CPT | Mod: PBBFAC

## 2022-10-04 PROCEDURE — 99999 PR PBB SHADOW E&M-EST. PATIENT-LVL IV: CPT | Mod: PBBFAC,,,

## 2022-10-04 PROCEDURE — 99215 OFFICE O/P EST HI 40 MIN: CPT | Mod: S$PBB,,,

## 2022-10-04 PROCEDURE — 99999 PR PBB SHADOW E&M-EST. PATIENT-LVL IV: ICD-10-PCS | Mod: PBBFAC,,,

## 2022-10-12 ENCOUNTER — PATIENT MESSAGE (OUTPATIENT)
Dept: RHEUMATOLOGY | Facility: CLINIC | Age: 37
End: 2022-10-12
Payer: OTHER GOVERNMENT

## 2022-10-12 ENCOUNTER — PATIENT MESSAGE (OUTPATIENT)
Dept: NEUROLOGY | Facility: CLINIC | Age: 37
End: 2022-10-12
Payer: OTHER GOVERNMENT

## 2022-10-26 ENCOUNTER — OFFICE VISIT (OUTPATIENT)
Dept: RHEUMATOLOGY | Facility: CLINIC | Age: 37
End: 2022-10-26
Payer: OTHER GOVERNMENT

## 2022-10-26 DIAGNOSIS — R76.9 ABNORMAL IMMUNOLOGICAL FINDING IN SERUM: Primary | ICD-10-CM

## 2022-10-26 DIAGNOSIS — M79.7 FIBROMYALGIA: ICD-10-CM

## 2022-10-26 DIAGNOSIS — Z79.899 ENCOUNTER FOR LONG-TERM (CURRENT) USE OF OTHER MEDICATIONS: ICD-10-CM

## 2022-10-26 DIAGNOSIS — E66.01 MORBID OBESITY: ICD-10-CM

## 2022-10-26 DIAGNOSIS — M60.9 MYOSITIS, UNSPECIFIED MYOSITIS TYPE, UNSPECIFIED SITE: ICD-10-CM

## 2022-10-26 DIAGNOSIS — Z71.89 COUNSELING AND COORDINATION OF CARE: ICD-10-CM

## 2022-10-26 PROCEDURE — 99214 OFFICE O/P EST MOD 30 MIN: CPT | Mod: 95,,, | Performed by: INTERNAL MEDICINE

## 2022-10-26 PROCEDURE — 99214 PR OFFICE/OUTPT VISIT, EST, LEVL IV, 30-39 MIN: ICD-10-PCS | Mod: 95,,, | Performed by: INTERNAL MEDICINE
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
## 2022-10-26 NOTE — PROCEDURES
Procedures  Chief Complaint   Patient presents with    Other Misc     EMG        Kiki Werner is a 37 y.o. female with a history of multiple medical diagnoses as listed below that presents for EMG/nerve conduction studies to evaluate suspicion for polymyositis.  She has been found have a positive KAYLA with myalgias, weakness, and increasing difficulty with her level of functioning.  She was seen by Rheumatology and after having multiple complaints including shortness of breath.  As part of a workup there was suspicion for myositis or a myalgia.  She was seen for EMG/nerve conduction studies to evaluate for this possibility..     PAST MEDICAL HISTORY:  Past Medical History:   Diagnosis Date    Headache     Memory loss        PAST SURGICAL HISTORY:  No past surgical history on file.    SOCIAL HISTORY:  Social History     Socioeconomic History    Marital status:    Tobacco Use    Smoking status: Never    Smokeless tobacco: Never   Substance and Sexual Activity    Alcohol use: Yes     Alcohol/week: 1.0 standard drink     Types: 1 Glasses of wine per week    Drug use: Never    Sexual activity: Yes     Birth control/protection: None       FAMILY HISTORY:  Family History   Problem Relation Age of Onset    Cancer Mother     Heart disease Mother     Migraines Mother     Neuropathy Mother     Heart failure Mother     Diabetes Father     Hyperlipidemia Father     Migraines Father     Neuropathy Father     Stroke Paternal Grandmother        ALLERGIES AND MEDICATIONS: updated and reviewed.  Review of patient's allergies indicates:   Allergen Reactions    Penicillins      Other reaction(s): Unknown reaction  As a Baby       Current Outpatient Medications   Medication Sig Dispense Refill    AJOVY SYRINGE 225 mg/1.5 mL injection Inject into the skin.      albuterol (VENTOLIN HFA) 90 mcg/actuation inhaler Inhale 2 puffs into the lungs every 4 (four) hours as needed for Shortness of Breath. Rescue 18 g 11    amitriptyline  (ELAVIL) 25 MG tablet Take 1 tablet (25 mg total) by mouth every evening. 30 tablet 3    celecoxib (CELEBREX) 200 MG capsule Take 1 capsule (200 mg total) by mouth 2 (two) times daily. 60 capsule 3    gabapentin (NEURONTIN) 300 MG capsule Take 2 capsules (600 mg total) by mouth every evening. 60 capsule 11    hydrOXYchloroQUINE (PLAQUENIL) 200 mg tablet Take 1 tablet (200 mg total) by mouth 2 (two) times daily. 60 tablet 6    methylPREDNISolone (MEDROL DOSEPACK) 4 mg tablet use as directed 1 each 0    methylPREDNISolone (MEDROL DOSEPACK) 4 mg tablet use as directed 1 each 0    NURTEC 75 mg odt Take 75 mg by mouth daily as needed.      oxyCODONE-acetaminophen (PERCOCET) 5-325 mg per tablet Take 1 tablet by mouth every 4 (four) hours as needed for Pain. 20 tablet 0    predniSONE (DELTASONE) 10 MG tablet       rimegepant (NURTEC) 75 mg odt   8 EA, Allow 1 tab to dissolve on tongue every 24 hours as needed for migraine headache. Not to exceed 1 tab in 24 hours or 18 tabs in 30 days, 0 Refill(s), Soft Stop       No current facility-administered medications for this visit.       Review of Systems   Constitutional:  Negative for activity change, appetite change, fever and unexpected weight change.   HENT:  Negative for trouble swallowing and voice change.    Eyes:  Negative for photophobia and visual disturbance.   Respiratory:  Negative for apnea and shortness of breath.    Cardiovascular:  Negative for chest pain and leg swelling.   Gastrointestinal:  Negative for constipation and nausea.   Genitourinary:  Negative for difficulty urinating.   Musculoskeletal:  Positive for arthralgias and myalgias. Negative for back pain, gait problem and neck pain.   Skin:  Negative for color change and pallor.   Neurological:  Negative for dizziness, seizures, syncope, weakness and numbness.   Hematological:  Negative for adenopathy.   Psychiatric/Behavioral:  Negative for agitation, confusion and decreased concentration.   "    Neurologic Exam     Mental Status   Oriented to person, place, and time.   Registration: recalls 3 of 3 objects.   Attention: normal. Concentration: normal.   Speech: speech is normal   Level of consciousness: alert  Knowledge: good.     Cranial Nerves     CN II   Right visual field deficit: none  Left visual field deficit: none     CN III, IV, VI   Extraocular motions are normal.   Right pupil: Size: 3 mm. Shape: regular.   Left pupil: Size: 3 mm. Shape: regular.   CN III: no CN III palsy  CN VI: no CN VI palsy  Nystagmus: none   Diplopia: none  Ophthalmoparesis: none  Upgaze: normal  Downgaze: normal  Conjugate gaze: present    CN VII   Facial expression full, symmetric.   Right facial weakness: none  Left facial weakness: none    CN VIII   CN VIII normal.     CN XI   CN XI normal.     CN XII   CN XII normal.   Tongue deviation: none    Motor Exam   Muscle bulk: normal  Overall muscle tone: normal  Right arm tone: normal  Left arm tone: normal  Right leg tone: normal  Left leg tone: normal    Gait, Coordination, and Reflexes     Gait  Gait: normal    Coordination   Finger to nose coordination: normal    Tremor   Resting tremor: absent    Physical Exam  Vitals reviewed.   Constitutional:       Appearance: She is well-developed.   HENT:      Head: Normocephalic and atraumatic.   Eyes:      Extraocular Movements: EOM normal.   Pulmonary:      Effort: Pulmonary effort is normal. No respiratory distress.   Musculoskeletal:         General: Normal range of motion.      Cervical back: Normal range of motion.   Neurological:      Mental Status: She is alert and oriented to person, place, and time.      Coordination: Finger-Nose-Finger Test normal.      Gait: Gait is intact.   Psychiatric:         Speech: Speech normal.         Behavior: Behavior normal.         Thought Content: Thought content normal.       Vitals:    08/30/22 0912   Weight: 108.6 kg (239 lb 6.7 oz)   Height: 5' 9" (1.753 m)       Assessment & " Plan:    Problem List Items Addressed This Visit    None  Visit Diagnoses       Myositis, unspecified myositis type, unspecified site              Mild carpal tunnel seen on examination.  No evidence of myositis or muscle inflammation in the muscles sampled.    Follow-up: No follow-ups on file.    This note was done with the assistance of voice recognition software. Some errors may be present after proofreading.

## 2022-11-12 ENCOUNTER — HOSPITAL ENCOUNTER (OUTPATIENT)
Dept: RADIOLOGY | Facility: HOSPITAL | Age: 37
Discharge: HOME | End: 2022-11-12
Attending: INTERNAL MEDICINE
Payer: OTHER GOVERNMENT

## 2022-11-12 DIAGNOSIS — M60.9 MYOSITIS, UNSPECIFIED MYOSITIS TYPE, UNSPECIFIED SITE: ICD-10-CM

## 2022-11-12 PROCEDURE — 73218 MRI UPPER EXTREMITY W/O DYE: CPT | Mod: TC,RT

## 2022-11-12 PROCEDURE — 73218 MRI HUMERUS WITHOUT CONTRAST RIGHT: ICD-10-PCS | Mod: 26,RT,, | Performed by: RADIOLOGY

## 2022-11-12 PROCEDURE — 73218 MRI UPPER EXTREMITY W/O DYE: CPT | Mod: 26,RT,, | Performed by: RADIOLOGY

## 2022-11-12 PROCEDURE — 73718 MRI FEMUR WITHOUT CONTRAST RIGHT: ICD-10-PCS | Mod: 26,RT,, | Performed by: RADIOLOGY

## 2022-11-12 PROCEDURE — 73718 MRI LOWER EXTREMITY W/O DYE: CPT | Mod: TC,RT

## 2022-11-12 PROCEDURE — 73718 MRI LOWER EXTREMITY W/O DYE: CPT | Mod: 26,RT,, | Performed by: RADIOLOGY

## 2022-11-14 ENCOUNTER — HOSPITAL ENCOUNTER (OUTPATIENT)
Dept: PULMONOLOGY | Facility: HOSPITAL | Age: 37
Discharge: HOME | End: 2022-11-14
Attending: INTERNAL MEDICINE
Payer: OTHER GOVERNMENT

## 2022-11-14 ENCOUNTER — OFFICE VISIT (OUTPATIENT)
Dept: PULMONOLOGY | Facility: CLINIC | Age: 37
End: 2022-11-14
Payer: OTHER GOVERNMENT

## 2022-11-14 ENCOUNTER — PATIENT MESSAGE (OUTPATIENT)
Dept: RHEUMATOLOGY | Facility: CLINIC | Age: 37
End: 2022-11-14
Payer: OTHER GOVERNMENT

## 2022-11-14 VITALS
SYSTOLIC BLOOD PRESSURE: 114 MMHG | BODY MASS INDEX: 35.79 KG/M2 | OXYGEN SATURATION: 99 % | DIASTOLIC BLOOD PRESSURE: 80 MMHG | WEIGHT: 241.63 LBS | HEIGHT: 69 IN | HEART RATE: 79 BPM

## 2022-11-14 DIAGNOSIS — M35.1 OVERLAP SYNDROME: ICD-10-CM

## 2022-11-14 DIAGNOSIS — R06.02 SHORTNESS OF BREATH: ICD-10-CM

## 2022-11-14 DIAGNOSIS — M33.20 POLYMYOSITIS: Primary | ICD-10-CM

## 2022-11-14 DIAGNOSIS — R53.81 PHYSICAL DECONDITIONING: ICD-10-CM

## 2022-11-14 DIAGNOSIS — J96.11 CHRONIC HYPOXEMIC RESPIRATORY FAILURE: ICD-10-CM

## 2022-11-14 PROCEDURE — 94729 DIFFUSING CAPACITY: CPT

## 2022-11-14 PROCEDURE — 94060 EVALUATION OF WHEEZING: CPT

## 2022-11-14 PROCEDURE — 94727 GAS DIL/WSHOT DETER LNG VOL: CPT

## 2022-11-14 PROCEDURE — 99999 PR PBB SHADOW E&M-EST. PATIENT-LVL III: CPT | Mod: PBBFAC,,, | Performed by: INTERNAL MEDICINE

## 2022-11-14 PROCEDURE — 94729 PR C02/MEMBANE DIFFUSE CAPACITY: ICD-10-PCS | Mod: 26,,, | Performed by: INTERNAL MEDICINE

## 2022-11-14 PROCEDURE — 94729 DIFFUSING CAPACITY: CPT | Mod: 26,,, | Performed by: INTERNAL MEDICINE

## 2022-11-14 PROCEDURE — 94060 PR EVAL OF BRONCHOSPASM: ICD-10-PCS | Mod: 26,,, | Performed by: INTERNAL MEDICINE

## 2022-11-14 PROCEDURE — 99213 OFFICE O/P EST LOW 20 MIN: CPT | Mod: PBBFAC,25,PO | Performed by: INTERNAL MEDICINE

## 2022-11-14 PROCEDURE — 94727 GAS DIL/WSHOT DETER LNG VOL: CPT | Mod: 26,,, | Performed by: INTERNAL MEDICINE

## 2022-11-14 PROCEDURE — 94060 EVALUATION OF WHEEZING: CPT | Mod: 26,,, | Performed by: INTERNAL MEDICINE

## 2022-11-14 PROCEDURE — 99214 PR OFFICE/OUTPT VISIT, EST, LEVL IV, 30-39 MIN: ICD-10-PCS | Mod: S$PBB,25,, | Performed by: INTERNAL MEDICINE

## 2022-11-14 PROCEDURE — 99214 OFFICE O/P EST MOD 30 MIN: CPT | Mod: S$PBB,25,, | Performed by: INTERNAL MEDICINE

## 2022-11-14 PROCEDURE — 94727 PR PULM FUNCTION TEST BY GAS: ICD-10-PCS | Mod: 26,,, | Performed by: INTERNAL MEDICINE

## 2022-11-14 PROCEDURE — 99999 PR PBB SHADOW E&M-EST. PATIENT-LVL III: ICD-10-PCS | Mod: PBBFAC,,, | Performed by: INTERNAL MEDICINE

## 2022-11-14 NOTE — PROGRESS NOTES
"11/14/2022    Kiki Werner  Follow up    Chief Complaint   Patient presents with    Shortness of Breath       HPI:   11/14/2022- pt still feels like breathing is same. She is still winded with exertion. 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  She developed difficulty lifting right leg and right arm which started July but has gradually gotten better. Rheumatologist has done MRIs.  She also has a neurologist in [place] following as well, had seen Patel in the past. She has appt next on 12/5.  She has ophthalmologist, has had optic nerve inflammation in past.    08/10/2022-   Continue home oxygen to keep sats >92%  Echo to check for pulmonary hypertension  Repeat PFT 3 months  Supine/erect breathing test  Continue follow up with rheumatology, neurology  Pt is a 36 yo female who carries a diagnosis by serology of polymyositis-scleroderma overlap syndrome, fibromyalgia. She started having health issues in 2014 primarily joints/muscle pains and now breathing problems progressing.  Meds: plaquenil, celebrex, gabapentin  Has tried steroids (medrol pack, prednisone 20d course) with no benefit.  She has been set up with home O2 last week- has POC and home concentrator.  pt reports started noticing wheezing when getting dressed and doing activities starting 2 mos ago, then had episode "Feels like breathing thru a straw" when trying to restrain student in behavioral class at school.  She notices breathlessness progressing over 2 mos, now with minor activity such as sweeping house. Episodes improve after 2 min resting.  She will use a rescue inhaler but unclear if it helps.  Feels like chest is congested but no cough/phlegm. Denies chest pains.  She does have DAT but not using cpap as she recently started to feel like " it was worsening her breathing issues. She sleeps propped on 3 pillows at night and cannot lie flat.    She sees neurologist for migraines- takes ajovy 2 mos now and previously took aimovig 1 yr.  FH significant for mother with lupus.  She denies hx of smoking or pulmonary exposures  She reports very mild childhood asthma but grew out of it.  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    6/29/2022-   Ordering lung function test, six minute walk to evaluate lung strength    Start 2 medications to help with shortness of breath  Trelegy 200 1 puff once a day every day, rinse mouth after using due to risk for thrush if mouth or tongue has white sores contact clinic    Albuterol Inhaler 1-2 puffs every 4 hours, for cough or shortness of breath\    referred by Rheumatologist Dr. Conley. Dx Polymyositis. On Plaquenill therapy, previously treated with systemic steroid with no benefit.  Positive for sleep apnea, wearing CPAP occasionally.     Sudden onset of shortness of breath that occurs at random, worse with exertion, improves with 5 minutes of rest. 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 Associated with chest tightness.   Complaint of wheeze when laying down at night. Has nocturnal awakens with SOB around 2-3 am 1 time weekly.    Social Hx: lives with family no pets, currently working as , no known Asbestosis exposure, no Smoking Hx  Family Hx: no Lung Cancer, no COPD, no Asthma  Medical Hx: no previous pneumonia ; no previous shoulder/chest surgery      The chief compliant  problem is new to me  PFSH:  Past Medical History:   Diagnosis Date    Headache     Memory loss          No past surgical history on file.  Social History     Tobacco Use    Smoking status: Never    Smokeless tobacco: Never   Substance Use Topics    Alcohol use: Yes     Alcohol/week: 1.0 standard drink     Types: 1 Glasses of wine per  "week    Drug use: Never     Family History   Problem Relation Age of Onset    Cancer Mother     Heart disease Mother     Migraines Mother     Neuropathy Mother     Heart failure Mother     Diabetes Father     Hyperlipidemia Father     Migraines Father     Neuropathy Father     Stroke Paternal Grandmother      Review of patient's allergies indicates:   Allergen Reactions    Penicillins      Other reaction(s): Unknown reaction  As a Baby       I have reviewed past medical, family, and social history. I have reviewed previous nurse notes.    Performance Status:The patient's activity level is functions out of house.      Review of Systems:  a review of eleven systems covering constitutional, Eye, HEENT, Psych, Respiratory, Cardiac, GI, , Musculoskeletal, Endocrine, Dermatologic was negative except for pertinent findings as listed ABOVE and below:   weak right hip flexion  Hoarseness, voice deeper 2 wks  raynauds fingertips  Muscle pain  Joint pains  Dark discoloration to sides of face    Swelling ankles, face and hands     Exam:Comprehensive exam done. /80 (BP Location: Left arm, Patient Position: Sitting, BP Method: Large (Automatic))   Pulse 79   Ht 5' 9" (1.753 m)   Wt 109.6 kg (241 lb 10 oz)   SpO2 99%   BMI 35.68 kg/m²   Exam included Vitals as listed, and patient's appearance and affect and alertness and mood, oral exam for yeast and hygiene and pharynx lesions and Mallapatti (M) score, neck with inspection for jvd and masses and thyroid abnormalities and lymph nodes (supraclavicular and infraclavicular nodes and axillary also examined and noted if abn), chest exam included symmetry and effort and fremitus and percussion and auscultation, cardiac exam included rhythm and gallops and murmur and rubs and jvd and edema, abdominal exam for mass and hepatosplenomegaly and tenderness and hernias and bowel sounds, Musculoskeletal exam with muscle tone and posture and mobility/gait and  strength, and skin " for rashes and cyanosis and pallor and turgor, extremity for clubbing.  Findings were normal except for pertinent findings listed below:   M1, oropharynx clear  HR regular no m/r/g  Breath sounds clear bilaterally  No rashes/synovitis noted  Nonpitting edema bilat ankles    Radiographs (ct chest and cxr) reviewed: view by direct vision   CT Chest Without Contrast  6/3/22- normal lung parenchyma bilaterally    EMG 1/29/21-   Impression:  This is a normal EMG including repetitive nerve stimulation at 3 Hz.  There is no evidence of disease of the neuromuscular junction on the study.  In addition, there is no evidence of focal neuropathy, peripheral neuropathy, plexopathy, radiculopathy or myopathy on the study.  Further exploration of disease of the neuromuscular junction could be evaluated with single fiber EMG if clinically warranted.    Labs reviewed        Latest Reference Range & Units 07/27/22 09:15   Aldolase 1.2 - 7.6 U/L 1.5      Latest Reference Range & Units 07/27/22 09:15   Sed Rate 0 - 20 mm/Hr 75 (H)      Latest Reference Range & Units 07/27/22 09:15   CRP 0.0 - 8.2 mg/L 14.9 (H)      Latest Reference Range & Units 11/18/21 11:04   KAYLA Screen Negative <1:80  Negative <1:80   Anti-SSA Antibody 0.00 - 0.99 Ratio 0.25   Anti-SSA Interpretation Negative  Negative   Anti-SSB Antibody 0.00 - 0.99 Ratio 0.29   Anti-SSB Interpretation Negative  Negative   Anti-Histone Antibody 0.0 - 0.9 Units 2.0 (H)      Latest Reference Range & Units 11/18/21 11:04   Anti-Maty-1 Antibody <20 Units <20   Anti-PM/Scl Ab <20 Units 51 (H)   Anti-SS-A 52 kD Ab, IgG <20 Units <20   Anti-U1-RNP  Ab <20 Units <20   CCP Antibodies <5.0 U/mL <0.5      Latest Reference Range & Units 01/14/21 12:07   P/Q Type Calcium Channel Ab <=0.02 nmol/L 0.04 (H)     PFT reviewed  9/13/22-   The estimated PA systolic pressure is 24 mmHg.  The left ventricle is normal in size with normal systolic function.  The estimated ejection fraction is 65%.  Normal  left ventricular diastolic function.  Normal right ventricular size with normal right ventricular systolic function.  Mild mitral regurgitation.  Mild tricuspid regurgitation.  Normal central venous pressure (3 mmHg).  No significant pulmonary hypertension by echocardiogram.    11/14/22- normal      8/10/22- supine erect PFT-- normal    Pulmonary Functions Testing Results:  7/14/22- mild restriction, reduced dlco      6mwt 7/14/22- 170m, desat to 86 w/ exertion, req 3L NC      Plan:  Clinical impression is resonably certain and repeated evaluation prn +/- follow up will be needed as below. Vague progressive dyspnea with dx of polymyositis-scleroderma overlap. She has no evidence of pulmonary involvement on CT chest, PFT now normalized. May have neurologic explanation for symptoms? Continues to f/u with neurologist. I suspect false positive O2 requirement on 6 min walk test - repeating now.    Kiki was seen today for shortness of breath.    Diagnoses and all orders for this visit:    Polymyositis  -     Complete PFT with bronchodilator; Future    Overlap syndrome  -     Complete PFT with bronchodilator; Future    Chronic hypoxemic respiratory failure  -     Six Minute Walk Test to qualify for Home Oxygen; Future    Physical deconditioning        Follow up in about 1 year (around 11/14/2023).    Discussed with patient above for education the following:      Patient Instructions   Lung tests are normal  I see no evidence of lung disease at this time. Continue to monitor your lungs with yearly PFT recommended  Echo with no evidence of pulmonary hypertension  Repeat 6 min walk test, check sat on the earlobe this time  An exercise program to build up endurance and be able to exert yourself with improved breathing is recommended  Continue to follow up with neurologist and rheumatologist

## 2022-11-14 NOTE — PATIENT INSTRUCTIONS
Lung tests are normal  I see no evidence of lung disease at this time. Continue to monitor your lungs with yearly PFT recommended  Echo with no evidence of pulmonary hypertension  Repeat 6 min walk test, check sat on the earlobe this time  An exercise program to build up endurance and be able to exert yourself with improved breathing is recommended  Continue to follow up with neurologist and rheumatologist

## 2022-11-16 LAB
BRPFT: NORMAL
DLCO ADJ PRE: 25.92 ML/(MIN*MMHG)
DLCO SINGLE BREATH LLN: 22.93
DLCO SINGLE BREATH PRE REF: 90.4 %
DLCO SINGLE BREATH REF: 28.66
DLCOC SBVA LLN: 3.74
DLCOC SBVA PRE REF: 98.9 %
DLCOC SBVA REF: 5.09
DLCOC SINGLE BREATH LLN: 22.93
DLCOC SINGLE BREATH PRE REF: 90.4 %
DLCOC SINGLE BREATH REF: 28.66
DLCOVA LLN: 3.74
DLCOVA PRE REF: 98.9 %
DLCOVA PRE: 5.04 ML/(MIN*MMHG*L)
DLCOVA REF: 5.09
DLVAADJ PRE: 5.04 ML/(MIN*MMHG*L)
ERVN2 LLN: -16448.81
ERVN2 PRE REF: 64.8 %
ERVN2 PRE: 0.77 L
ERVN2 REF: 1.19
FEF 25 75 CHG: 10.5 %
FEF 25 75 LLN: 1.75
FEF 25 75 POST REF: 92.2 %
FEF 25 75 PRE REF: 83.4 %
FEF 25 75 REF: 3.17
FET100 CHG: -0.2 %
FEV1 CHG: 0.5 %
FEV1 FVC CHG: 2.6 %
FEV1 FVC LLN: 72
FEV1 FVC POST REF: 98.9 %
FEV1 FVC PRE REF: 96.4 %
FEV1 FVC REF: 83
FEV1 LLN: 2.35
FEV1 POST REF: 97.6 %
FEV1 PRE REF: 97.1 %
FEV1 REF: 3.03
FRCN2 LLN: 2.09
FRCN2 PRE REF: 65.3 %
FRCN2 REF: 2.91
FVC CHG: -2 %
FVC LLN: 2.88
FVC POST REF: 98.2 %
FVC PRE REF: 100.2 %
FVC REF: 3.67
IVC PRE: 3.65 L
IVC SINGLE BREATH LLN: 2.88
IVC SINGLE BREATH PRE REF: 99.4 %
IVC SINGLE BREATH REF: 3.67
MVV LLN: 112
MVV PRE REF: 93.7 %
MVV REF: 132
PEF CHG: -8.6 %
PEF LLN: 5.12
PEF POST REF: 104.2 %
PEF PRE REF: 114 %
PEF REF: 7.39
POST FEF 25 75: 2.92 L/S
POST FET 100: 7.82 SEC
POST FEV1 FVC: 82 %
POST FEV1: 2.96 L
POST FVC: 3.61 L
POST PEF: 7.7 L/S
PRE DLCO: 25.92 ML/(MIN*MMHG)
PRE FEF 25 75: 2.65 L/S
PRE FET 100: 7.83 SEC
PRE FEV1 FVC: 79.92 %
PRE FEV1: 2.94 L
PRE FRC N2: 1.9 L
PRE FVC: 3.68 L
PRE MVV: 124 L/MIN
PRE PEF: 8.42 L/S
RVN2 LLN: 1.15
RVN2 PRE REF: 57.2 %
RVN2 PRE: 0.99 L
RVN2 REF: 1.72
RVN2TLCN2 LLN: 21.95
RVN2TLCN2 PRE REF: 65.7 %
RVN2TLCN2 PRE: 20.72 %
RVN2TLCN2 REF: 31.54
TLCN2 LLN: 4.64
TLCN2 PRE REF: 84.5 %
TLCN2 PRE: 4.76 L
TLCN2 REF: 5.63
VA PRE: 5.14 L
VA SINGLE BREATH LLN: 5.48
VA SINGLE BREATH PRE REF: 93.9 %
VA SINGLE BREATH REF: 5.48
VCMAXN2 LLN: 2.88
VCMAXN2 PRE REF: 102.7 %
VCMAXN2 PRE: 3.77 L
VCMAXN2 REF: 3.67

## 2022-12-08 NOTE — PROGRESS NOTES
Subjective:       Patient ID: Kiki Werner is a 37 y.o. female.    Chief Complaint:  EMG Abnormalities      History of Present Illness  37 year old female who presents today for evaluation of multiple complaints. Past medical history below. She state she has been experiencing intermittent times where she has difficulty walking, trouble walking up stairs, and inability to lift RLE. Notes that their has been times where she was unable to lift her right arm at all. Been having this issues since 2014. Has been to several providers for this issues. Currently following a neurologist in [place]. Further reports having issues with holding a toothbrush, combing her hair, and coming out of a seated position when she has these episodes. Notes difficulty breathing with exercise. Is currently following optometry for suspension of glaucoma had 3 LPs in the past for evaluation including [place] and GBS. Reports that she is not currently experiencing any symptoms of weakness. States this can happen spontaneously. Currently works as a teacher. Reports her mom has a history of Lupus.     Recent EMG done in Aug 2022 in right arm and leg. Only revealed mild carpel tunnel syndrome. EMG in 2021 that was normal   Has been seen by Rheumatology in the past and diagnosed with Fibromyalgia. Recently started Amytriptyline, prior use Lyrica and Sevella.     Past Medical History:   Diagnosis Date    Headache     Memory loss        History reviewed. No pertinent surgical history.    Family History   Problem Relation Age of Onset    Cancer Mother     Heart disease Mother     Migraines Mother     Neuropathy Mother     Heart failure Mother     Diabetes Father     Hyperlipidemia Father     Migraines Father     Neuropathy Father     Stroke Paternal Grandmother        Social History     Socioeconomic History    Marital status:    Tobacco Use    Smoking status: Never    Smokeless tobacco: Never   Substance and Sexual Activity    Alcohol use: Yes      Alcohol/week: 1.0 standard drink     Types: 1 Glasses of wine per week    Drug use: Never    Sexual activity: Yes     Birth control/protection: None       Current Outpatient Medications   Medication Sig Dispense Refill    AJOVY SYRINGE 225 mg/1.5 mL injection Inject into the skin.      amitriptyline (ELAVIL) 25 MG tablet Take 1 tablet (25 mg total) by mouth every evening. 30 tablet 3    celecoxib (CELEBREX) 200 MG capsule Take 1 capsule (200 mg total) by mouth 2 (two) times daily. 60 capsule 3    gabapentin (NEURONTIN) 300 MG capsule Take 2 capsules (600 mg total) by mouth every evening. 60 capsule 11    hydrOXYchloroQUINE (PLAQUENIL) 200 mg tablet Take 1 tablet (200 mg total) by mouth 2 (two) times daily. 60 tablet 6    NURTEC 75 mg odt Take 75 mg by mouth daily as needed.      oxyCODONE-acetaminophen (PERCOCET) 5-325 mg per tablet Take 1 tablet by mouth every 4 (four) hours as needed for Pain. 20 tablet 0    predniSONE (DELTASONE) 10 MG tablet       rimegepant (NURTEC) 75 mg odt   8 EA, Allow 1 tab to dissolve on tongue every 24 hours as needed for migraine headache. Not to exceed 1 tab in 24 hours or 18 tabs in 30 days, 0 Refill(s), Soft Stop       No current facility-administered medications for this visit.       Review of patient's allergies indicates:   Allergen Reactions    Penicillins      Other reaction(s): Unknown reaction  As a Baby          Review of Systems  Review of Systems   Constitutional: Negative.    HENT: Negative.     Eyes: Negative.    Respiratory: Negative.     Cardiovascular: Negative.    Gastrointestinal: Negative.    Endocrine: Negative.    Genitourinary: Negative.    Musculoskeletal: Negative.    Skin: Negative.    Neurological:  Positive for weakness.   Psychiatric/Behavioral: Negative.       Objective:      Neurologic Exam     Mental Status   Oriented to person, place, and time.   Attention: normal. Concentration: normal.   Speech: speech is normal   Level of consciousness:  alert    Cranial Nerves     CN II   Visual acuity: decreased    CN III, IV, VI   Pupils are equal, round, and reactive to light.  Extraocular motions are normal.   CN III: no CN III palsy  CN VI: no CN VI palsy  Nystagmus: none   Diplopia: none  Ophthalmoparesis: none    CN V   Facial sensation intact.     CN VII   Facial expression full, symmetric.     CN VIII   CN VIII normal.     CN IX, X   CN IX normal.   CN X normal.     CN XI   CN XI normal.     CN XII   CN XII normal.     Motor Exam   Muscle bulk: normal  Overall muscle tone: normal  Right arm tone: normal  Left arm tone: normal  Right arm pronator drift: absent  Left arm pronator drift: absent  Right leg tone: normal  Left leg tone: normal    Strength   Right neck flexion: 5/5  Left neck flexion: 5/5  Right neck extension: 5/5  Left neck extension: 5/5  Right deltoid: 5/5  Left deltoid: 5/5  Right biceps: 5/5  Left biceps: 5/5  Right triceps: 5/5  Left triceps: 5/5  Right wrist flexion: 5/5  Left wrist flexion: 5/5  Right wrist extension: 5/5  Left wrist extension: 5/5  Right interossei: 5/5  Left interossei: 5/5  Right abdominals: 5/5  Left abdominals: 5/5  Right iliopsoas: 5/5  Left iliopsoas: 5/5  Right quadriceps: 5/5  Left quadriceps: 5/5  Right hamstrin/5  Left hamstrin/5  Right glutei: 5/5  Left glutei: 5/5  Right anterior tibial: 5/5  Left anterior tibial: 5/5  Right posterior tibial: 5/5  Left posterior tibial: 5/5  Right peroneal: 5/5  Left peroneal: 5/5  Right gastroc: 5/5  Left gastroc: 5/5    Sensory Exam   Light touch normal.   Vibration normal.   Proprioception normal.   Pinprick normal.     Gait, Coordination, and Reflexes     Gait  Gait: normal    Coordination   Romberg: negative    Tremor   Resting tremor: absent    Reflexes   Right brachioradialis: 2+  Left brachioradialis: 2+  Right biceps: 2+  Left biceps: 2+  Right triceps: 2+  Left triceps: 2+  Right patellar: 2+  Left patellar: 2+  Right achilles: 2+  Left achilles: 2+  Right  : 2+  Left : 2+    Physical Exam  HENT:      Head: Normocephalic and atraumatic.   Eyes:      Extraocular Movements: EOM normal.      Pupils: Pupils are equal, round, and reactive to light.   Pulmonary:      Effort: Pulmonary effort is normal.   Skin:     General: Skin is warm and dry.   Neurological:      Mental Status: She is alert and oriented to person, place, and time.      Coordination: Romberg Test normal.      Gait: Gait is intact.      Deep Tendon Reflexes:      Reflex Scores:       Tricep reflexes are 2+ on the right side and 2+ on the left side.       Bicep reflexes are 2+ on the right side and 2+ on the left side.       Brachioradialis reflexes are 2+ on the right side and 2+ on the left side.       Patellar reflexes are 2+ on the right side and 2+ on the left side.       Achilles reflexes are 2+ on the right side and 2+ on the left side.  Psychiatric:         Mood and Affect: Mood normal.         Speech: Speech normal.         Behavior: Behavior is cooperative.         Assessment and Plan:       1. EMG (electromyogram) abnormalities  -recent EMG only showed mild carpel tunnel. With her results polymyositis is unlikely.    2. Muscle weakness    Follow up as needed

## 2022-12-29 ENCOUNTER — PATIENT MESSAGE (OUTPATIENT)
Dept: RHEUMATOLOGY | Facility: CLINIC | Age: 37
End: 2022-12-29
Payer: OTHER GOVERNMENT

## 2023-01-25 ENCOUNTER — OFFICE VISIT (OUTPATIENT)
Dept: RHEUMATOLOGY | Facility: CLINIC | Age: 38
End: 2023-01-25
Payer: OTHER GOVERNMENT

## 2023-01-25 DIAGNOSIS — Z79.899 IMMUNODEFICIENCY DUE TO TREATMENT WITH IMMUNOSUPPRESSIVE MEDICATION: ICD-10-CM

## 2023-01-25 DIAGNOSIS — Z71.89 COUNSELING AND COORDINATION OF CARE: ICD-10-CM

## 2023-01-25 DIAGNOSIS — Z79.1 NSAID LONG-TERM USE: ICD-10-CM

## 2023-01-25 DIAGNOSIS — G72.9 MYOPATHY: Primary | ICD-10-CM

## 2023-01-25 DIAGNOSIS — M79.7 FIBROMYALGIA: ICD-10-CM

## 2023-01-25 DIAGNOSIS — D84.821 IMMUNODEFICIENCY DUE TO TREATMENT WITH IMMUNOSUPPRESSIVE MEDICATION: ICD-10-CM

## 2023-01-25 DIAGNOSIS — E66.01 MORBID OBESITY: ICD-10-CM

## 2023-01-25 PROCEDURE — 99214 OFFICE O/P EST MOD 30 MIN: CPT | Mod: 95,,, | Performed by: INTERNAL MEDICINE

## 2023-01-25 PROCEDURE — 99214 PR OFFICE/OUTPT VISIT, EST, LEVL IV, 30-39 MIN: ICD-10-PCS | Mod: 95,,, | Performed by: INTERNAL MEDICINE
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
## 2023-01-26 ENCOUNTER — PATIENT MESSAGE (OUTPATIENT)
Dept: RHEUMATOLOGY | Facility: CLINIC | Age: 38
End: 2023-01-26
Payer: OTHER GOVERNMENT

## 2023-01-30 ENCOUNTER — PATIENT MESSAGE (OUTPATIENT)
Dept: RHEUMATOLOGY | Facility: CLINIC | Age: 38
End: 2023-01-30
Payer: OTHER GOVERNMENT

## 2023-01-30 ENCOUNTER — LAB VISIT (OUTPATIENT)
Dept: LAB | Facility: CLINIC | Age: 38
End: 2023-01-30
Payer: OTHER GOVERNMENT

## 2023-01-30 ENCOUNTER — TELEPHONE (OUTPATIENT)
Dept: RHEUMATOLOGY | Facility: CLINIC | Age: 38
End: 2023-01-30
Payer: OTHER GOVERNMENT

## 2023-01-30 DIAGNOSIS — G72.9 MYOPATHY: ICD-10-CM

## 2023-01-30 DIAGNOSIS — R29.818 NEURODISABILITY: Primary | ICD-10-CM

## 2023-01-30 PROCEDURE — 82085 ASSAY OF ALDOLASE: CPT | Performed by: INTERNAL MEDICINE

## 2023-01-30 PROCEDURE — 86334 PATHOLOGIST INTERPRETATION IFE: ICD-10-PCS | Mod: 26,,, | Performed by: PATHOLOGY

## 2023-01-30 PROCEDURE — 86160 COMPLEMENT ANTIGEN: CPT | Mod: 59 | Performed by: INTERNAL MEDICINE

## 2023-01-30 PROCEDURE — 86140 C-REACTIVE PROTEIN: CPT | Performed by: INTERNAL MEDICINE

## 2023-01-30 PROCEDURE — 82306 VITAMIN D 25 HYDROXY: CPT | Performed by: INTERNAL MEDICINE

## 2023-01-30 PROCEDURE — 83519 RIA NONANTIBODY: CPT | Performed by: INTERNAL MEDICINE

## 2023-01-30 PROCEDURE — 83516 IMMUNOASSAY NONANTIBODY: CPT | Performed by: INTERNAL MEDICINE

## 2023-01-30 PROCEDURE — 86376 MICROSOMAL ANTIBODY EACH: CPT | Performed by: INTERNAL MEDICINE

## 2023-01-30 PROCEDURE — 86334 IMMUNOFIX E-PHORESIS SERUM: CPT | Mod: 26,,, | Performed by: PATHOLOGY

## 2023-01-30 PROCEDURE — 86363 MOG-IGG1 ANTB FLO CYTMTRY EA: CPT | Performed by: INTERNAL MEDICINE

## 2023-01-30 PROCEDURE — 83520 IMMUNOASSAY QUANT NOS NONAB: CPT | Performed by: INTERNAL MEDICINE

## 2023-01-30 PROCEDURE — 82784 ASSAY IGA/IGD/IGG/IGM EACH: CPT | Performed by: INTERNAL MEDICINE

## 2023-01-30 PROCEDURE — 84445 ASSAY OF TSI GLOBULIN: CPT | Performed by: INTERNAL MEDICINE

## 2023-01-30 PROCEDURE — 82550 ASSAY OF CK (CPK): CPT | Performed by: INTERNAL MEDICINE

## 2023-01-30 PROCEDURE — 83615 LACTATE (LD) (LDH) ENZYME: CPT | Performed by: INTERNAL MEDICINE

## 2023-01-30 PROCEDURE — 86160 COMPLEMENT ANTIGEN: CPT | Performed by: INTERNAL MEDICINE

## 2023-01-30 PROCEDURE — 86225 DNA ANTIBODY NATIVE: CPT | Performed by: INTERNAL MEDICINE

## 2023-01-30 PROCEDURE — 82787 IGG 1 2 3 OR 4 EACH: CPT | Mod: 59 | Performed by: INTERNAL MEDICINE

## 2023-01-30 PROCEDURE — 80053 COMPREHEN METABOLIC PANEL: CPT | Performed by: INTERNAL MEDICINE

## 2023-01-30 PROCEDURE — 86334 IMMUNOFIX E-PHORESIS SERUM: CPT | Performed by: INTERNAL MEDICINE

## 2023-01-30 PROCEDURE — 82607 VITAMIN B-12: CPT | Performed by: INTERNAL MEDICINE

## 2023-01-30 PROCEDURE — 86235 NUCLEAR ANTIGEN ANTIBODY: CPT | Performed by: INTERNAL MEDICINE

## 2023-01-30 PROCEDURE — 82390 ASSAY OF CERULOPLASMIN: CPT | Performed by: INTERNAL MEDICINE

## 2023-01-30 PROCEDURE — 84165 PROTEIN E-PHORESIS SERUM: CPT | Performed by: INTERNAL MEDICINE

## 2023-01-30 PROCEDURE — 83529 ASAY OF INTERLEUKIN-6 (IL-6): CPT | Performed by: INTERNAL MEDICINE

## 2023-01-30 PROCEDURE — 84165 PROTEIN E-PHORESIS SERUM: CPT | Mod: 26,,, | Performed by: PATHOLOGY

## 2023-01-30 PROCEDURE — 82164 ANGIOTENSIN I ENZYME TEST: CPT | Performed by: INTERNAL MEDICINE

## 2023-01-30 PROCEDURE — 84443 ASSAY THYROID STIM HORMONE: CPT | Performed by: INTERNAL MEDICINE

## 2023-01-30 PROCEDURE — 84165 PATHOLOGIST INTERPRETATION SPE: ICD-10-PCS | Mod: 26,,, | Performed by: PATHOLOGY

## 2023-01-30 NOTE — TELEPHONE ENCOUNTER
----- Message from Juan J Ortega sent at 1/30/2023  3:55 PM CST -----  Contact: Actito  .Type:  Needs Medical Advice    Who Called:[facility] ( [facility] )    Would the patient rather a call back or a response via [facility]?  Call back  Best Call Back Number:  [phone]  Additional Information:  Pt. at the lab and she needs orders for Zinc, Dany and HMSBR sent to the above lab.  Fax # [phone] .  Pt. Labs were not drawn at [facility].

## 2023-01-31 LAB
ALBUMIN SERPL BCP-MCNC: 3.6 G/DL (ref 3.5–5.2)
ALP SERPL-CCNC: 104 U/L (ref 55–135)
ALT SERPL W/O P-5'-P-CCNC: 16 U/L (ref 10–44)
ANION GAP SERPL CALC-SCNC: 9 MMOL/L (ref 8–16)
AST SERPL-CCNC: 23 U/L (ref 10–40)
BILIRUB SERPL-MCNC: 0.3 MG/DL (ref 0.1–1)
BLOOD COLLECTION FOR MS PROFILE: NORMAL
BUN SERPL-MCNC: 8 MG/DL (ref 6–20)
C3 SERPL-MCNC: 148 MG/DL (ref 50–180)
C4 SERPL-MCNC: 29 MG/DL (ref 11–44)
CALCIUM SERPL-MCNC: 8.7 MG/DL (ref 8.7–10.5)
CERULOPLASMIN SERPL-MCNC: 37 MG/DL (ref 15–45)
CHLORIDE SERPL-SCNC: 105 MMOL/L (ref 95–110)
CK SERPL-CCNC: 132 U/L (ref 20–180)
CO2 SERPL-SCNC: 24 MMOL/L (ref 23–29)
CREAT SERPL-MCNC: 0.8 MG/DL (ref 0.5–1.4)
CRP SERPL-MCNC: 17.5 MG/L (ref 0–8.2)
EST. GFR  (NO RACE VARIABLE): >60 ML/MIN/1.73 M^2
GLUCOSE SERPL-MCNC: 93 MG/DL (ref 70–110)
IGA SERPL-MCNC: 417 MG/DL (ref 40–350)
IGG SERPL-MCNC: 1734 MG/DL (ref 650–1600)
IGM SERPL-MCNC: 153 MG/DL (ref 50–300)
LDH SERPL L TO P-CCNC: 208 U/L (ref 110–260)
POTASSIUM SERPL-SCNC: 3.3 MMOL/L (ref 3.5–5.1)
PROT SERPL-MCNC: 8 G/DL (ref 6–8.4)
SODIUM SERPL-SCNC: 138 MMOL/L (ref 136–145)
THYROPEROXIDASE IGG SERPL-ACNC: <6 IU/ML
TSH SERPL DL<=0.005 MIU/L-ACNC: 1.79 UIU/ML (ref 0.4–4)

## 2023-02-01 ENCOUNTER — PATIENT MESSAGE (OUTPATIENT)
Dept: RHEUMATOLOGY | Facility: CLINIC | Age: 38
End: 2023-02-01
Payer: OTHER GOVERNMENT

## 2023-02-01 LAB
25(OH)D3+25(OH)D2 SERPL-MCNC: 11 NG/ML (ref 30–96)
ALBUMIN SERPL ELPH-MCNC: 3.65 G/DL (ref 3.35–5.55)
ALDOLASE SERPL-CCNC: 3.2 U/L (ref 1.2–7.6)
ALPHA1 GLOB SERPL ELPH-MCNC: 0.35 G/DL (ref 0.17–0.41)
ALPHA2 GLOB SERPL ELPH-MCNC: 0.88 G/DL (ref 0.43–0.99)
B-GLOBULIN SERPL ELPH-MCNC: 0.98 G/DL (ref 0.5–1.1)
DSDNA AB SER-ACNC: NORMAL [IU]/ML
GAMMA GLOB SERPL ELPH-MCNC: 1.74 G/DL (ref 0.67–1.58)
INTERPRETATION SERPL IFE-IMP: NORMAL
PROT SERPL-MCNC: 7.6 G/DL (ref 6–8.4)
VIT B12 SERPL-MCNC: 617 PG/ML (ref 210–950)

## 2023-02-02 ENCOUNTER — TELEPHONE (OUTPATIENT)
Dept: RHEUMATOLOGY | Facility: CLINIC | Age: 38
End: 2023-02-02
Payer: OTHER GOVERNMENT

## 2023-02-02 LAB
ACE SERPL-CCNC: 32 U/L (ref 16–85)
ENA SCL70 IGG SER IA-ACNC: <0.2 U
IL6 SERPL-MCNC: 7.1 PG/ML
PATHOLOGIST INTERPRETATION IFE: NORMAL
PATHOLOGIST INTERPRETATION SPE: NORMAL

## 2023-02-02 NOTE — TELEPHONE ENCOUNTER
----- Message from Maye Calvert sent at 2/1/2023  9:30 AM CST -----  .Type:  Orders    Who Called: [facility]  Would the patient rather a call back or a response via [facility]? call  Best Call Back Number:  Fax    Additional Information:     Pt received labs 1/30 that weren't able to be completed   Orders are needed for 3 test:    Zinc   Copper   HMSBR

## 2023-02-03 LAB
ACHR BLOCK AB/ACHR TOTAL SFR SER: 13 % (ref 0–26)
IGG1 SER-MCNC: ABNORMAL MG/DL (ref 382–929)
IGG2 SER-MCNC: 344 MG/DL (ref 242–700)
IGG3 SER-MCNC: 42 MG/DL (ref 22–176)
IGG4 SER-MCNC: 88 MG/DL (ref 4–86)
SOL IL2 RECEP SERPL-MCNC: 698.2 PG/ML (ref 175.3–858.2)
TSI SER-ACNC: <0.1 IU/L

## 2023-02-04 LAB — ACHR BIND AB SER-SCNC: 0 NMOL/L

## 2023-02-08 LAB
CNS DEMYELINATING DISEASE EVAL: NORMAL
MOG-IGG1: NEGATIVE
NMO/AQP4 FACS,S: NEGATIVE
NMO/AQP4 FACS,S: NEGATIVE

## 2023-02-20 ENCOUNTER — TELEPHONE (OUTPATIENT)
Dept: RHEUMATOLOGY | Facility: CLINIC | Age: 38
End: 2023-02-20

## 2023-02-20 NOTE — TELEPHONE ENCOUNTER
Called pt back , informed a msg will be sent to [facility]. Yael iiadrian regards . Due to lab mix up some results haven't been completed or marked as done from 1/30 . Please advise

## 2023-02-20 NOTE — TELEPHONE ENCOUNTER
----- Message from Maye Vallecillo sent at 2/20/2023 11:56 AM CST -----  Contact: Patient  Type:  Needs Medical Advice    Who Called: Patient       Would the patient rather a call back or a response via [facility]? Call    Best Call Back Number: [phone] (home) [phone] (work)    Additional Information: Patient is missing results through the portal. Please call to advise

## 2023-03-07 ENCOUNTER — PATIENT MESSAGE (OUTPATIENT)
Dept: RHEUMATOLOGY | Facility: CLINIC | Age: 38
End: 2023-03-07
Payer: OTHER GOVERNMENT

## 2023-04-14 ENCOUNTER — PATIENT MESSAGE (OUTPATIENT)
Dept: RHEUMATOLOGY | Facility: CLINIC | Age: 38
End: 2023-04-14
Payer: OTHER GOVERNMENT

## 2023-06-07 ENCOUNTER — OFFICE VISIT (OUTPATIENT)
Dept: RHEUMATOLOGY | Facility: CLINIC | Age: 38
End: 2023-06-07
Payer: OTHER GOVERNMENT

## 2023-06-07 DIAGNOSIS — R76.8 POSITIVE ANA (ANTINUCLEAR ANTIBODY): Primary | ICD-10-CM

## 2023-06-07 DIAGNOSIS — Z79.52 LONG TERM SYSTEMIC STEROID USER: ICD-10-CM

## 2023-06-07 DIAGNOSIS — E66.01 MORBID OBESITY: ICD-10-CM

## 2023-06-07 DIAGNOSIS — Z79.899 IMMUNODEFICIENCY DUE TO TREATMENT WITH IMMUNOSUPPRESSIVE MEDICATION: ICD-10-CM

## 2023-06-07 DIAGNOSIS — D84.821 IMMUNODEFICIENCY DUE TO TREATMENT WITH IMMUNOSUPPRESSIVE MEDICATION: ICD-10-CM

## 2023-06-07 DIAGNOSIS — Z71.89 COUNSELING AND COORDINATION OF CARE: ICD-10-CM

## 2023-06-07 DIAGNOSIS — M79.7 FIBROMYALGIA: ICD-10-CM

## 2023-06-07 PROCEDURE — 99214 PR OFFICE/OUTPT VISIT, EST, LEVL IV, 30-39 MIN: ICD-10-PCS | Mod: 95,,, | Performed by: INTERNAL MEDICINE

## 2023-06-07 PROCEDURE — 99214 OFFICE O/P EST MOD 30 MIN: CPT | Mod: 95,,, | Performed by: INTERNAL MEDICINE

## 2023-06-07 RX ORDER — PREDNISONE 10 MG/1
TABLET ORAL
Qty: 89 TABLET | Refills: 0 | Status: SHIPPED | OUTPATIENT
Start: 2023-06-07 | End: 2023-07-02
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
## 2023-06-21 ENCOUNTER — PATIENT MESSAGE (OUTPATIENT)
Dept: RHEUMATOLOGY | Facility: CLINIC | Age: 38
End: 2023-06-21
Payer: OTHER GOVERNMENT

## 2023-07-31 ENCOUNTER — PATIENT MESSAGE (OUTPATIENT)
Dept: NEUROLOGY | Facility: CLINIC | Age: 38
End: 2023-07-31

## 2023-07-31 ENCOUNTER — TELEPHONE (OUTPATIENT)
Dept: NEUROLOGY | Facility: CLINIC | Age: 38
End: 2023-07-31
Payer: OTHER GOVERNMENT

## 2023-07-31 ENCOUNTER — LAB VISIT (OUTPATIENT)
Dept: LAB | Facility: HOSPITAL | Age: 38
End: 2023-07-31
Attending: PSYCHIATRY & NEUROLOGY
Payer: OTHER GOVERNMENT

## 2023-07-31 ENCOUNTER — OFFICE VISIT (OUTPATIENT)
Dept: NEUROLOGY | Facility: CLINIC | Age: 38
End: 2023-07-31
Payer: OTHER GOVERNMENT

## 2023-07-31 VITALS
BODY MASS INDEX: 33.96 KG/M2 | HEART RATE: 84 BPM | WEIGHT: 229.25 LBS | DIASTOLIC BLOOD PRESSURE: 78 MMHG | SYSTOLIC BLOOD PRESSURE: 115 MMHG | HEIGHT: 69 IN

## 2023-07-31 DIAGNOSIS — M62.81 MUSCLE WEAKNESS: ICD-10-CM

## 2023-07-31 DIAGNOSIS — M62.81 MUSCLE WEAKNESS: Primary | ICD-10-CM

## 2023-07-31 LAB
ESTIMATED AVG GLUCOSE: 105 MG/DL (ref 68–131)
HBA1C MFR BLD: 5.3 % (ref 4–5.6)

## 2023-07-31 PROCEDURE — 99215 PR OFFICE/OUTPT VISIT, EST, LEVL V, 40-54 MIN: ICD-10-PCS | Mod: S$PBB,,, | Performed by: PSYCHIATRY & NEUROLOGY

## 2023-07-31 PROCEDURE — 99999 PR PBB SHADOW E&M-EST. PATIENT-LVL III: ICD-10-PCS | Mod: PBBFAC,,, | Performed by: PSYCHIATRY & NEUROLOGY

## 2023-07-31 PROCEDURE — 99215 OFFICE O/P EST HI 40 MIN: CPT | Mod: S$PBB,,, | Performed by: PSYCHIATRY & NEUROLOGY

## 2023-07-31 PROCEDURE — 99417 PROLNG OP E/M EACH 15 MIN: CPT | Mod: S$PBB,,, | Performed by: PSYCHIATRY & NEUROLOGY

## 2023-07-31 PROCEDURE — 99417 PR PROLONGED SVC, OUTPT, W/WO DIRECT PT CONTACT,  EA ADDTL 15 MIN: ICD-10-PCS | Mod: S$PBB,,, | Performed by: PSYCHIATRY & NEUROLOGY

## 2023-07-31 PROCEDURE — 99213 OFFICE O/P EST LOW 20 MIN: CPT | Mod: PBBFAC | Performed by: PSYCHIATRY & NEUROLOGY

## 2023-07-31 PROCEDURE — 99999 PR PBB SHADOW E&M-EST. PATIENT-LVL III: CPT | Mod: PBBFAC,,, | Performed by: PSYCHIATRY & NEUROLOGY

## 2023-07-31 PROCEDURE — 83036 HEMOGLOBIN GLYCOSYLATED A1C: CPT | Performed by: PSYCHIATRY & NEUROLOGY

## 2023-07-31 PROCEDURE — 36415 COLL VENOUS BLD VENIPUNCTURE: CPT | Performed by: PSYCHIATRY & NEUROLOGY

## 2023-07-31 NOTE — TELEPHONE ENCOUNTER
Left a message for the patient to ask if she can come in at 3:00 instead of 4:00 for her appointment today. Also sent message in "[employer].
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
## 2023-07-31 NOTE — TELEPHONE ENCOUNTER
----- Message from Jeremiah Webb sent at 7/31/2023 11:19 AM CDT -----  Regarding: 3pm appt confirmed for today  Contact: [phone]  Pt returning a call to Saray pt is confirming she can come in today for 3pm instead of 4pm.

## 2023-08-16 ENCOUNTER — PATIENT MESSAGE (OUTPATIENT)
Dept: NEUROLOGY | Facility: CLINIC | Age: 38
End: 2023-08-16
Payer: OTHER GOVERNMENT

## 2023-08-21 ENCOUNTER — PATIENT MESSAGE (OUTPATIENT)
Dept: RESEARCH | Facility: HOSPITAL | Age: 38
End: 2023-08-21
Payer: OTHER GOVERNMENT

## 2023-08-28 ENCOUNTER — PATIENT MESSAGE (OUTPATIENT)
Dept: NEUROLOGY | Facility: CLINIC | Age: 38
End: 2023-08-28
Payer: OTHER GOVERNMENT

## 2023-09-06 ENCOUNTER — OFFICE VISIT (OUTPATIENT)
Dept: RHEUMATOLOGY | Facility: CLINIC | Age: 38
End: 2023-09-06
Payer: OTHER GOVERNMENT

## 2023-09-06 ENCOUNTER — PATIENT MESSAGE (OUTPATIENT)
Dept: NEUROLOGY | Facility: CLINIC | Age: 38
End: 2023-09-06
Payer: OTHER GOVERNMENT

## 2023-09-06 DIAGNOSIS — R89.9 ABNORMAL LABORATORY TEST: Primary | ICD-10-CM

## 2023-09-06 DIAGNOSIS — Z71.89 COUNSELING AND COORDINATION OF CARE: ICD-10-CM

## 2023-09-06 DIAGNOSIS — E66.9 OBESITY, UNSPECIFIED CLASSIFICATION, UNSPECIFIED OBESITY TYPE, UNSPECIFIED WHETHER SERIOUS COMORBIDITY PRESENT: ICD-10-CM

## 2023-09-06 DIAGNOSIS — M79.7 FIBROMYALGIA: ICD-10-CM

## 2023-09-06 PROCEDURE — 99214 OFFICE O/P EST MOD 30 MIN: CPT | Mod: 95,,, | Performed by: INTERNAL MEDICINE

## 2023-09-06 PROCEDURE — 99214 PR OFFICE/OUTPT VISIT, EST, LEVL IV, 30-39 MIN: ICD-10-PCS | Mod: 95,,, | Performed by: INTERNAL MEDICINE
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
## 2023-10-16 ENCOUNTER — OFFICE VISIT (OUTPATIENT)
Dept: NEUROLOGY | Facility: CLINIC | Age: 38
End: 2023-10-16
Payer: OTHER GOVERNMENT

## 2023-10-16 ENCOUNTER — LAB VISIT (OUTPATIENT)
Dept: LAB | Facility: HOSPITAL | Age: 38
End: 2023-10-16
Attending: PSYCHIATRY & NEUROLOGY
Payer: OTHER GOVERNMENT

## 2023-10-16 VITALS
WEIGHT: 236.31 LBS | DIASTOLIC BLOOD PRESSURE: 77 MMHG | HEIGHT: 68 IN | HEART RATE: 75 BPM | SYSTOLIC BLOOD PRESSURE: 113 MMHG | BODY MASS INDEX: 35.81 KG/M2

## 2023-10-16 DIAGNOSIS — M62.81 MUSCLE WEAKNESS: ICD-10-CM

## 2023-10-16 DIAGNOSIS — M62.81 MUSCLE WEAKNESS: Primary | ICD-10-CM

## 2023-10-16 PROCEDURE — 99215 PR OFFICE/OUTPT VISIT, EST, LEVL V, 40-54 MIN: ICD-10-PCS | Mod: S$PBB,,, | Performed by: PSYCHIATRY & NEUROLOGY

## 2023-10-16 PROCEDURE — 36415 COLL VENOUS BLD VENIPUNCTURE: CPT | Performed by: PSYCHIATRY & NEUROLOGY

## 2023-10-16 PROCEDURE — 99999 PR PBB SHADOW E&M-EST. PATIENT-LVL III: CPT | Mod: PBBFAC,,, | Performed by: PSYCHIATRY & NEUROLOGY

## 2023-10-16 PROCEDURE — 99213 OFFICE O/P EST LOW 20 MIN: CPT | Mod: PBBFAC | Performed by: PSYCHIATRY & NEUROLOGY

## 2023-10-16 PROCEDURE — 99999 PR PBB SHADOW E&M-EST. PATIENT-LVL III: ICD-10-PCS | Mod: PBBFAC,,, | Performed by: PSYCHIATRY & NEUROLOGY

## 2023-10-16 PROCEDURE — 99215 OFFICE O/P EST HI 40 MIN: CPT | Mod: S$PBB,,, | Performed by: PSYCHIATRY & NEUROLOGY

## 2023-10-16 PROCEDURE — 82726 LONG CHAIN FATTY ACIDS: CPT | Performed by: PSYCHIATRY & NEUROLOGY
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
## 2023-10-20 LAB
ANNOTATION COMMENT IMP: NORMAL
ANNOTATION COMMENT IMP: NORMAL
PHYTANATE SERPL-SCNC: 0.8 NMOL/ML
PHYTANATE SERPL-SCNC: 0.81 NMOL/ML
PRISTANATE SERPL-SCNC: 0.04 NMOL/ML
PRISTANATE SERPL-SCNC: 0.05 NMOL/ML
PRISTANATE/PHYTANATE SERPL-SRTO: 0.05 RATIO
PRISTANATE/PHYTANATE SERPL-SRTO: 0.06 RATIO
VLCFA C22:0 SERPL-SCNC: 56.4 NMOL/ML
VLCFA C22:0 SERPL-SCNC: 57.3 NMOL/ML
VLCFA C24:0 SERPL-SCNC: 48.3 NMOL/ML
VLCFA C24:0 SERPL-SCNC: 48.8 NMOL/ML
VLCFA C24:0/C22:0 SERPL-SRTO: 0.85 RATIO
VLCFA C24:0/C22:0 SERPL-SRTO: 0.86 RATIO
VLCFA C26:0 SERPL-SCNC: 0.37 NMOL/ML
VLCFA C26:0 SERPL-SCNC: 0.38 NMOL/ML
VLCFA C26:0/C22:0 SERPL-SRTO: 0.01 RATIO
VLCFA C26:0/C22:0 SERPL-SRTO: 0.01 RATIO

## 2023-10-25 ENCOUNTER — TELEPHONE (OUTPATIENT)
Dept: NEUROLOGY | Facility: CLINIC | Age: 38
End: 2023-10-25
Payer: OTHER GOVERNMENT

## 2023-10-25 NOTE — TELEPHONE ENCOUNTER
----- Message from Vance Flowers MD sent at 10/25/2023  1:15 PM CDT -----  Please confirm with laboratory if test was sent to [place] for adrenal leukodystrophy assessment.  Thank you

## 2023-11-09 ENCOUNTER — PATIENT MESSAGE (OUTPATIENT)
Dept: NEUROLOGY | Facility: CLINIC | Age: 38
End: 2023-11-09
Payer: OTHER GOVERNMENT

## 2023-12-20 ENCOUNTER — PATIENT MESSAGE (OUTPATIENT)
Dept: RHEUMATOLOGY | Facility: CLINIC | Age: 38
End: 2023-12-20
Payer: OTHER GOVERNMENT

## 2024-01-08 ENCOUNTER — PATIENT MESSAGE (OUTPATIENT)
Dept: NEUROLOGY | Facility: CLINIC | Age: 39
End: 2024-01-08
Payer: OTHER GOVERNMENT

## 2024-01-18 ENCOUNTER — PATIENT MESSAGE (OUTPATIENT)
Dept: NEUROLOGY | Facility: CLINIC | Age: 39
End: 2024-01-18
Payer: OTHER GOVERNMENT

## 2024-01-30 ENCOUNTER — TELEPHONE (OUTPATIENT)
Dept: NEUROLOGY | Facility: CLINIC | Age: 39
End: 2024-01-30
Payer: OTHER GOVERNMENT

## 2024-01-30 NOTE — TELEPHONE ENCOUNTER
"----- Message from Hortencia Siddiqui sent at 1/30/2024  1:40 PM CST -----  Regarding: pt advice  Contact: [phone]  Name Of Caller: self     Contact Preference?:  [phone]     What is the nature of the call?: would like to know if the muscle biopsy referral has been put in or sent to the provider that she is suppose to see. She would like to know what's the next steps     Additional Notes:    "Thank you for all that you do for our patients"        "

## 2024-02-07 ENCOUNTER — LAB VISIT (OUTPATIENT)
Dept: LAB | Facility: HOSPITAL | Age: 39
End: 2024-02-07
Attending: SURGERY
Payer: OTHER GOVERNMENT

## 2024-02-07 ENCOUNTER — OFFICE VISIT (OUTPATIENT)
Dept: SURGERY | Facility: CLINIC | Age: 39
End: 2024-02-07
Payer: OTHER GOVERNMENT

## 2024-02-07 VITALS — SYSTOLIC BLOOD PRESSURE: 119 MMHG | HEART RATE: 84 BPM | DIASTOLIC BLOOD PRESSURE: 84 MMHG | TEMPERATURE: 97 F

## 2024-02-07 DIAGNOSIS — M62.81 MUSCLE WEAKNESS: ICD-10-CM

## 2024-02-07 DIAGNOSIS — M62.81 MUSCLE WEAKNESS: Primary | ICD-10-CM

## 2024-02-07 DIAGNOSIS — M33.20 POLYMYOSITIS: ICD-10-CM

## 2024-02-07 LAB
ANION GAP SERPL CALC-SCNC: 5 MMOL/L (ref 8–16)
B-HCG UR QL: NEGATIVE
BASOPHILS # BLD AUTO: 0.04 K/UL (ref 0–0.2)
BASOPHILS NFR BLD: 0.6 % (ref 0–1.9)
BUN SERPL-MCNC: 10 MG/DL (ref 6–20)
CALCIUM SERPL-MCNC: 9.4 MG/DL (ref 8.7–10.5)
CHLORIDE SERPL-SCNC: 101 MMOL/L (ref 95–110)
CO2 SERPL-SCNC: 27 MMOL/L (ref 23–29)
CREAT SERPL-MCNC: 0.7 MG/DL (ref 0.5–1.4)
DIFFERENTIAL METHOD BLD: NORMAL
EOSINOPHIL # BLD AUTO: 0.1 K/UL (ref 0–0.5)
EOSINOPHIL NFR BLD: 1.1 % (ref 0–8)
ERYTHROCYTE [DISTWIDTH] IN BLOOD BY AUTOMATED COUNT: 12.9 % (ref 11.5–14.5)
EST. GFR  (NO RACE VARIABLE): >60 ML/MIN/1.73 M^2
GLUCOSE SERPL-MCNC: 94 MG/DL (ref 70–110)
HCT VFR BLD AUTO: 40.8 % (ref 37–48.5)
HGB BLD-MCNC: 13.2 G/DL (ref 12–16)
IMM GRANULOCYTES # BLD AUTO: 0.01 K/UL (ref 0–0.04)
IMM GRANULOCYTES NFR BLD AUTO: 0.2 % (ref 0–0.5)
LYMPHOCYTES # BLD AUTO: 2.2 K/UL (ref 1–4.8)
LYMPHOCYTES NFR BLD: 34.3 % (ref 18–48)
MCH RBC QN AUTO: 30.9 PG (ref 27–31)
MCHC RBC AUTO-ENTMCNC: 32.4 G/DL (ref 32–36)
MCV RBC AUTO: 96 FL (ref 82–98)
MONOCYTES # BLD AUTO: 0.5 K/UL (ref 0.3–1)
MONOCYTES NFR BLD: 8.2 % (ref 4–15)
NEUTROPHILS # BLD AUTO: 3.5 K/UL (ref 1.8–7.7)
NEUTROPHILS NFR BLD: 55.6 % (ref 38–73)
NRBC BLD-RTO: 0 /100 WBC
PLATELET # BLD AUTO: 308 K/UL (ref 150–450)
PMV BLD AUTO: 9.8 FL (ref 9.2–12.9)
POTASSIUM SERPL-SCNC: 4.2 MMOL/L (ref 3.5–5.1)
RBC # BLD AUTO: 4.27 M/UL (ref 4–5.4)
SODIUM SERPL-SCNC: 133 MMOL/L (ref 136–145)
WBC # BLD AUTO: 6.35 K/UL (ref 3.9–12.7)

## 2024-02-07 PROCEDURE — 80048 BASIC METABOLIC PNL TOTAL CA: CPT | Performed by: SURGERY

## 2024-02-07 PROCEDURE — 99214 OFFICE O/P EST MOD 30 MIN: CPT | Mod: PBBFAC,PN | Performed by: SURGERY

## 2024-02-07 PROCEDURE — 85025 COMPLETE CBC W/AUTO DIFF WBC: CPT | Performed by: SURGERY

## 2024-02-07 PROCEDURE — 99203 OFFICE O/P NEW LOW 30 MIN: CPT | Mod: S$PBB,,, | Performed by: SURGERY

## 2024-02-07 PROCEDURE — 36415 COLL VENOUS BLD VENIPUNCTURE: CPT | Performed by: SURGERY

## 2024-02-07 PROCEDURE — 99999 PR PBB SHADOW E&M-EST. PATIENT-LVL IV: CPT | Mod: PBBFAC,,, | Performed by: SURGERY

## 2024-02-07 PROCEDURE — 81025 URINE PREGNANCY TEST: CPT | Performed by: SURGERY

## 2024-02-07 NOTE — H&P
GENERAL SURGERY  OUTPATIENT H&P    REASON FOR VISIT/CC: Muscle biopsy    HPI: Kiki Werner is a 38 y.o. female with chronic history muscle weakness and possible polymyositis though no obvious autoimmune or genetic etiology has been identified. She has been seen by Neurology who is recommended muscle biopsy. Patient reports relatively equal weakness in bilateral shoulders and upper thigh/hips. Symptoms can fluctuate.    I have reviewed the patient's chart including prior progress notes, procedures and testing.     ROS:   Review of Systems    PROBLEM LIST:  Patient Active Problem List   Diagnosis    Polymyositis         HISTORY  Past Medical History:   Diagnosis Date    Headache     Memory loss        No past surgical history on file.    Social History     Tobacco Use    Smoking status: Never    Smokeless tobacco: Never   Substance Use Topics    Alcohol use: Yes     Alcohol/week: 1.0 standard drink of alcohol     Types: 1 Glasses of wine per week    Drug use: Never       Family History   Problem Relation Age of Onset    Cancer Mother     Heart disease Mother     Migraines Mother     Neuropathy Mother     Heart failure Mother     Diabetes Father     Hyperlipidemia Father     Migraines Father     Neuropathy Father     Stroke Paternal Grandmother          MEDS:  Current Outpatient Medications on File Prior to Visit   Medication Sig Dispense Refill    rimegepant (NURTEC) 75 mg odt   8 EA, Allow 1 tab to dissolve on tongue every 24 hours as needed for migraine headache. Not to exceed 1 tab in 24 hours or 18 tabs in 30 days, 0 Refill(s), Soft Stop      [DISCONTINUED] NURTEC 75 mg odt Take 75 mg by mouth daily as needed.       No current facility-administered medications on file prior to visit.       ALLERGIES:  Review of patient's allergies indicates:   Allergen Reactions    Penicillins      Other reaction(s): Unknown reaction  As a Baby           VITALS:  Vitals:    02/07/24 1113   BP: 119/84   Pulse: 84   Temp: 97.3 °F  (36.3 °C)         PHYSICAL EXAM:  Physical Exam  Vitals reviewed.   Constitutional:       General: She is not in acute distress.     Appearance: Normal appearance. She is well-developed.   HENT:      Head: Normocephalic and atraumatic.      Nose: Nose normal.   Eyes:      General: No scleral icterus.     Conjunctiva/sclera: Conjunctivae normal.   Neck:      Trachea: No tracheal tenderness or tracheal deviation.   Cardiovascular:      Rate and Rhythm: Normal rate and regular rhythm.      Pulses: Normal pulses.   Pulmonary:      Effort: Pulmonary effort is normal. No accessory muscle usage or respiratory distress.      Breath sounds: Normal breath sounds.   Abdominal:      General: There is no distension.      Palpations: Abdomen is soft.      Tenderness: There is no abdominal tenderness.   Musculoskeletal:         General: No swelling or tenderness. Normal range of motion.      Cervical back: Normal range of motion and neck supple. No rigidity.   Skin:     General: Skin is warm and dry.      Coloration: Skin is not jaundiced.      Findings: No erythema.   Neurological:      General: No focal deficit present.      Mental Status: She is alert and oriented to person, place, and time.      Motor: No weakness or abnormal muscle tone.   Psychiatric:         Mood and Affect: Mood normal.         Behavior: Behavior normal.         Thought Content: Thought content normal.         Judgment: Judgment normal.           LABS:  Lab Results   Component Value Date    WBC 5.47 07/27/2022    RBC 4.18 07/27/2022    HGB 12.8 07/27/2022    HCT 38.7 07/27/2022     07/27/2022     Lab Results   Component Value Date    GLU 93 01/30/2023     01/30/2023    K 3.3 (L) 01/30/2023     01/30/2023    CO2 24 01/30/2023    BUN 8 01/30/2023    CREATININE 0.8 01/30/2023    CALCIUM 8.7 01/30/2023     Lab Results   Component Value Date    ALT 16 01/30/2023    AST 23 01/30/2023    ALKPHOS 104 01/30/2023    BILITOT 0.3 01/30/2023     No  "results found for: "MG", "PHOS"    STUDIES:  Images and reports were personally reviewed.  N/a      ASSESSMENT & PLAN:  38 y.o. female with generalized muscle weakness of uncertain etiology   -muscle biopsy has been requested in order to evaluate for pathological diagnosis  -procedure discussed with patient, options of deltoid versus thigh biopsy were reviewed since patient was symptomatic in both muscle groups   -after discussion of risks including pain, bleeding scarring infection, hematoma, inadequate tissue for diagnosis, etc. patient was agreeable to proceed with surgical biopsy   -will plan for either a right or left deltoid versus thigh muscle biopsy  -will plan to send specimen fresh to pathology for evaluation  -will message neurologist to determine if they have a preference in regards to muscle biopsy location          "

## 2024-02-14 ENCOUNTER — ANESTHESIA EVENT (OUTPATIENT)
Dept: SURGERY | Facility: HOSPITAL | Age: 39
End: 2024-02-14
Payer: OTHER GOVERNMENT

## 2024-02-14 NOTE — ANESTHESIA PREPROCEDURE EVALUATION
02/14/2024  Kiki Werner is a 38 y.o., female.      Patient Active Problem List   Diagnosis    Polymyositis       Past Surgical History:   Procedure Laterality Date    MASS EXCISION Right     under right arm    TENDON REPAIR Right     hand        Tobacco Use:  The patient  reports that she has never smoked. She has never used smokeless tobacco.     No results found for this or any previous visit.          Lab Results   Component Value Date    WBC 6.35 02/07/2024    HGB 13.2 02/07/2024    HCT 40.8 02/07/2024    MCV 96 02/07/2024     02/07/2024     BMP  Lab Results   Component Value Date     (L) 02/07/2024    K 4.2 02/07/2024     02/07/2024    CO2 27 02/07/2024    BUN 10 02/07/2024    CREATININE 0.7 02/07/2024    CALCIUM 9.4 02/07/2024    ANIONGAP 5 (L) 02/07/2024    GLU 94 02/07/2024    GLU 93 01/30/2023    GLU 90 07/27/2022 02/07/24 12:43   hCG Qualitative, Urine Negative       Echo    Interpretation Summary  · The estimated PA systolic pressure is 24 mmHg.  · The left ventricle is normal in size with normal systolic function.  · The estimated ejection fraction is 65%.  · Normal left ventricular diastolic function.  · Normal right ventricular size with normal right ventricular systolic function.  · Mild mitral regurgitation.  · Mild tricuspid regurgitation.  · Normal central venous pressure (3 mmHg).  · No significant pulmonary hypertension by echocardiogram.         Pre-op Assessment    I have reviewed the Patient Summary Reports.     I have reviewed the Nursing Notes. I have reviewed the NPO Status.   I have reviewed the Medications.     Review of Systems  Anesthesia Hx:  No problems with previous Anesthesia             Denies Family Hx of Anesthesia complications.    Denies Personal Hx of Anesthesia complications.                    Social:  Non-Smoker, Alcohol Use        Hematology/Oncology:  Hematology Normal   Oncology Normal                                   EENT/Dental:  EENT/Dental Normal           Cardiovascular:  Cardiovascular Normal                                            Pulmonary:  Pulmonary Normal                       Renal/:  Renal/ Normal                 Hepatic/GI:  Hepatic/GI Normal                 Musculoskeletal:  Musculoskeletal Normal    Generalize Muscle Weakness  Muscle Disorders: Polymyositis           Neurological:    Neuromuscular Disease,  Headaches     History of Memory Loss                            Endocrine:  Endocrine Normal            Psych:  Psychiatric Normal                    Physical Exam  General: Well nourished, Cooperative, Alert and Oriented    Airway:  Mallampati: II   Mouth Opening: Normal  TM Distance: Normal  Tongue: Normal  Neck ROM: Normal ROM    Dental:  Intact    Chest/Lungs:  Clear to auscultation, Normal Respiratory Rate    Heart:  Rate: Normal  Rhythm: Regular Rhythm        Anesthesia Plan  Type of Anesthesia, risks & benefits discussed:    Anesthesia Type: Gen Supraglottic Airway  Intra-op Monitoring Plan: Standard ASA Monitors  Post Op Pain Control Plan: multimodal analgesia and IV/PO Opioids PRN  Induction:  IV  Informed Consent: Informed consent signed with the Patient and all parties understand the risks and agree with anesthesia plan.  All questions answered.   ASA Score: 2  Anesthesia Plan Notes: General LMA    TIVA  Benadryl 6.25mg iv, Decadron 8mg, iv Zofran 4mg iv, Pepcid 20mg iv   Ofirmev 1000mg iv, Possible Precedex and/or Ketamine with Robinul          Ready For Surgery From Anesthesia Perspective.     .

## 2024-02-15 ENCOUNTER — ANESTHESIA (OUTPATIENT)
Dept: SURGERY | Facility: HOSPITAL | Age: 39
End: 2024-02-15
Payer: OTHER GOVERNMENT

## 2024-02-15 ENCOUNTER — HOSPITAL ENCOUNTER (OUTPATIENT)
Facility: HOSPITAL | Age: 39
Discharge: HOME | End: 2024-02-15
Attending: SURGERY | Admitting: SURGERY
Payer: OTHER GOVERNMENT

## 2024-02-15 VITALS
WEIGHT: 248 LBS | DIASTOLIC BLOOD PRESSURE: 81 MMHG | TEMPERATURE: 98 F | HEIGHT: 68 IN | SYSTOLIC BLOOD PRESSURE: 114 MMHG | OXYGEN SATURATION: 98 % | RESPIRATION RATE: 16 BRPM | BODY MASS INDEX: 37.59 KG/M2 | HEART RATE: 78 BPM

## 2024-02-15 DIAGNOSIS — Z41.9 SURGERY, ELECTIVE: ICD-10-CM

## 2024-02-15 DIAGNOSIS — M62.81 MUSCLE WEAKNESS: Primary | ICD-10-CM

## 2024-02-15 DIAGNOSIS — M33.20 POLYMYOSITIS: ICD-10-CM

## 2024-02-15 LAB
B-HCG UR QL: NEGATIVE
CTP QC/QA: YES

## 2024-02-15 PROCEDURE — C9290 INJ, BUPIVACAINE LIPOSOME: HCPCS | Performed by: SURGERY

## 2024-02-15 PROCEDURE — D9220A PRA ANESTHESIA: Mod: ANES,,, | Performed by: STUDENT IN AN ORGANIZED HEALTH CARE EDUCATION/TRAINING PROGRAM

## 2024-02-15 PROCEDURE — 63600175 PHARM REV CODE 636 W HCPCS: Performed by: NURSE ANESTHETIST, CERTIFIED REGISTERED

## 2024-02-15 PROCEDURE — D9220A PRA ANESTHESIA: Mod: CRNA,,, | Performed by: NURSE ANESTHETIST, CERTIFIED REGISTERED

## 2024-02-15 PROCEDURE — 88305 TISSUE EXAM BY PATHOLOGIST: CPT | Mod: TC | Performed by: PATHOLOGY

## 2024-02-15 PROCEDURE — 36000705 HC OR TIME LEV I EA ADD 15 MIN: Performed by: SURGERY

## 2024-02-15 PROCEDURE — 01610 ANES ALL PX NRV MUSC SHO&AX: CPT | Performed by: SURGERY

## 2024-02-15 PROCEDURE — 25000003 PHARM REV CODE 250: Performed by: NURSE ANESTHETIST, CERTIFIED REGISTERED

## 2024-02-15 PROCEDURE — 37000008 HC ANESTHESIA 1ST 15 MINUTES: Performed by: SURGERY

## 2024-02-15 PROCEDURE — 37000009 HC ANESTHESIA EA ADD 15 MINS: Performed by: SURGERY

## 2024-02-15 PROCEDURE — 71000015 HC POSTOP RECOV 1ST HR: Performed by: SURGERY

## 2024-02-15 PROCEDURE — 20205 DEEP MUSCLE BIOPSY: CPT | Mod: ,,, | Performed by: SURGERY

## 2024-02-15 PROCEDURE — 63600175 PHARM REV CODE 636 W HCPCS: Mod: JZ,JG | Performed by: SURGERY

## 2024-02-15 PROCEDURE — 36000704 HC OR TIME LEV I 1ST 15 MIN: Performed by: SURGERY

## 2024-02-15 PROCEDURE — 71000033 HC RECOVERY, INTIAL HOUR: Performed by: SURGERY

## 2024-02-15 PROCEDURE — 25000003 PHARM REV CODE 250: Performed by: STUDENT IN AN ORGANIZED HEALTH CARE EDUCATION/TRAINING PROGRAM

## 2024-02-15 PROCEDURE — 81025 URINE PREGNANCY TEST: CPT | Performed by: STUDENT IN AN ORGANIZED HEALTH CARE EDUCATION/TRAINING PROGRAM

## 2024-02-15 RX ORDER — HYDROMORPHONE HYDROCHLORIDE 1 MG/ML
0.2 INJECTION, SOLUTION INTRAMUSCULAR; INTRAVENOUS; SUBCUTANEOUS EVERY 5 MIN PRN
Status: DISCONTINUED | OUTPATIENT
Start: 2024-02-15 | End: 2024-02-15 | Stop reason: HOSPADM

## 2024-02-15 RX ORDER — ONDANSETRON HYDROCHLORIDE 2 MG/ML
INJECTION, SOLUTION INTRAVENOUS
Status: DISCONTINUED | OUTPATIENT
Start: 2024-02-15 | End: 2024-02-15

## 2024-02-15 RX ORDER — OXYCODONE HYDROCHLORIDE 5 MG/1
5 TABLET ORAL
Status: DISCONTINUED | OUTPATIENT
Start: 2024-02-15 | End: 2024-02-15 | Stop reason: HOSPADM

## 2024-02-15 RX ORDER — ONDANSETRON HYDROCHLORIDE 2 MG/ML
4 INJECTION, SOLUTION INTRAVENOUS DAILY PRN
Status: DISCONTINUED | OUTPATIENT
Start: 2024-02-15 | End: 2024-02-15 | Stop reason: HOSPADM

## 2024-02-15 RX ORDER — DIPHENHYDRAMINE HYDROCHLORIDE 50 MG/ML
INJECTION INTRAMUSCULAR; INTRAVENOUS
Status: DISCONTINUED | OUTPATIENT
Start: 2024-02-15 | End: 2024-02-15

## 2024-02-15 RX ORDER — PROPOFOL 10 MG/ML
INJECTION, EMULSION INTRAVENOUS
Status: DISCONTINUED | OUTPATIENT
Start: 2024-02-15 | End: 2024-02-15

## 2024-02-15 RX ORDER — DIPHENHYDRAMINE HYDROCHLORIDE 50 MG/ML
12.5 INJECTION INTRAMUSCULAR; INTRAVENOUS
Status: DISCONTINUED | OUTPATIENT
Start: 2024-02-15 | End: 2024-02-15 | Stop reason: HOSPADM

## 2024-02-15 RX ORDER — FENTANYL CITRATE 50 UG/ML
INJECTION, SOLUTION INTRAMUSCULAR; INTRAVENOUS
Status: DISCONTINUED | OUTPATIENT
Start: 2024-02-15 | End: 2024-02-15

## 2024-02-15 RX ORDER — KETAMINE HYDROCHLORIDE 10 MG/ML
INJECTION, SOLUTION INTRAMUSCULAR; INTRAVENOUS
Status: DISCONTINUED | OUTPATIENT
Start: 2024-02-15 | End: 2024-02-15

## 2024-02-15 RX ORDER — PROPOFOL 10 MG/ML
VIAL (ML) INTRAVENOUS CONTINUOUS PRN
Status: DISCONTINUED | OUTPATIENT
Start: 2024-02-15 | End: 2024-02-15

## 2024-02-15 RX ORDER — LIDOCAINE HYDROCHLORIDE 20 MG/ML
INJECTION INTRAVENOUS
Status: DISCONTINUED | OUTPATIENT
Start: 2024-02-15 | End: 2024-02-15

## 2024-02-15 RX ORDER — BUPIVACAINE HYDROCHLORIDE 2.5 MG/ML
INJECTION, SOLUTION EPIDURAL; INFILTRATION; INTRACAUDAL
Status: DISCONTINUED | OUTPATIENT
Start: 2024-02-15 | End: 2024-02-15 | Stop reason: HOSPADM

## 2024-02-15 RX ORDER — DEXAMETHASONE SODIUM PHOSPHATE 4 MG/ML
INJECTION, SOLUTION INTRA-ARTICULAR; INTRALESIONAL; INTRAMUSCULAR; INTRAVENOUS; SOFT TISSUE
Status: DISCONTINUED | OUTPATIENT
Start: 2024-02-15 | End: 2024-02-15

## 2024-02-15 RX ORDER — FAMOTIDINE 10 MG/ML
INJECTION INTRAVENOUS
Status: DISCONTINUED | OUTPATIENT
Start: 2024-02-15 | End: 2024-02-15

## 2024-02-15 RX ORDER — ACETAMINOPHEN 10 MG/ML
INJECTION, SOLUTION INTRAVENOUS
Status: DISCONTINUED | OUTPATIENT
Start: 2024-02-15 | End: 2024-02-15

## 2024-02-15 RX ORDER — MIDAZOLAM HYDROCHLORIDE 1 MG/ML
INJECTION INTRAMUSCULAR; INTRAVENOUS
Status: DISCONTINUED | OUTPATIENT
Start: 2024-02-15 | End: 2024-02-15

## 2024-02-15 RX ORDER — HYDROCODONE BITARTRATE AND ACETAMINOPHEN 5; 325 MG/1; MG/1
1 TABLET ORAL EVERY 6 HOURS PRN
Qty: 12 TABLET | Refills: 0 | Status: SHIPPED | OUTPATIENT
Start: 2024-02-15 | End: 2024-03-14 | Stop reason: ALTCHOICE

## 2024-02-15 RX ADMIN — DEXAMETHASONE SODIUM PHOSPHATE 8 MG: 4 INJECTION, SOLUTION INTRAMUSCULAR; INTRAVENOUS at 07:02

## 2024-02-15 RX ADMIN — PROPOFOL 100 MCG/KG/MIN: 10 INJECTION, EMULSION INTRAVENOUS at 07:02

## 2024-02-15 RX ADMIN — KETAMINE HYDROCHLORIDE 50 MG: 10 INJECTION, SOLUTION INTRAMUSCULAR; INTRAVENOUS at 07:02

## 2024-02-15 RX ADMIN — SODIUM CHLORIDE, SODIUM LACTATE, POTASSIUM CHLORIDE, AND CALCIUM CHLORIDE: .6; .31; .03; .02 INJECTION, SOLUTION INTRAVENOUS at 07:02

## 2024-02-15 RX ADMIN — FENTANYL CITRATE 50 MCG: 50 INJECTION, SOLUTION INTRAMUSCULAR; INTRAVENOUS at 07:02

## 2024-02-15 RX ADMIN — OXYCODONE HYDROCHLORIDE 5 MG: 5 TABLET ORAL at 08:02

## 2024-02-15 RX ADMIN — FENTANYL CITRATE 50 MCG: 50 INJECTION, SOLUTION INTRAMUSCULAR; INTRAVENOUS at 08:02

## 2024-02-15 RX ADMIN — PROPOFOL 200 MG: 10 INJECTION, EMULSION INTRAVENOUS at 07:02

## 2024-02-15 RX ADMIN — ONDANSETRON 4 MG: 2 INJECTION INTRAMUSCULAR; INTRAVENOUS at 07:02

## 2024-02-15 RX ADMIN — MIDAZOLAM HYDROCHLORIDE 2 MG: 1 INJECTION, SOLUTION INTRAMUSCULAR; INTRAVENOUS at 07:02

## 2024-02-15 RX ADMIN — FAMOTIDINE 20 MG: 10 INJECTION, SOLUTION INTRAVENOUS at 07:02

## 2024-02-15 RX ADMIN — LIDOCAINE HYDROCHLORIDE 50 MG: 20 INJECTION, SOLUTION INTRAVENOUS at 07:02

## 2024-02-15 RX ADMIN — ACETAMINOPHEN 1000 MG: 10 INJECTION, SOLUTION INTRAVENOUS at 07:02

## 2024-02-15 RX ADMIN — DIPHENHYDRAMINE HYDROCHLORIDE 6.25 MG: 50 INJECTION INTRAMUSCULAR; INTRAVENOUS at 07:02

## 2024-02-15 NOTE — OP NOTE
DATE OF PROCEDURE: 02/15/2024    PREOPERATIVE DIAGNOSIS: Polymyositis    POSTOPERATIVE DIAGNOSIS: Same    PROCEDURE: Left deltoid muscle biopsy    SURGEON: Tenzin Norton M.D    ASSISTANT: Adelaida Tovar MD PGY V    ANESTHESIA: General    ESTIMATED BLOOD LOSS: Minimal    SPECIMEN: Left deltoid muscle biopsy    CONDITION: Stable    COMPLICATIONS: None    FINDINGS:   1. Deltoid muscle appeared healthy and viable  2. Longitudinal core of muscle excised and sent to pathology per protocol     INDICATIONS: The patient is a 38-year-old female with upper and lower extremity weakness of uncertain etiology.  Muscle biopsy was requested by Neurology.    PROCEDURE IN DETAIL: Patient was taken operating room placed in supine position where general anesthesia was induced and LMA was placed. She had a slight bump placed underneath her left shoulder in the area was prepped and draped typical sterile fashion.  Time-out performed by members of the operative team. A longitudinal incision was planned.  Local anesthetic was injected at the skin and dermis only. Then the skin was incised sharply.  Electrocautery was used to dissect through subcutaneous tissue until the muscle fascia was encountered. We then performed sharp dissection going through the fascia. A core of muscle attempting to get a 1 cm thick by 3 cm long specimen was dissected.  It was tied off with silk sutures. It was transected and passed for pathology.  It was placed on a lightly dampened gauze sponge and passed for pathology.  Hemostasis was assured.  The fascial layer was closed with Vicryl suture.  The deep dermal layer was closed with Vicryl suture.  The skin was closed with Monocryl subcuticular stitch and Dermabond.  Patient was aroused from sedation, extubated taken to recovery room stable condition have suffered no complications.  All counts correct x2 the case.  I was present scrubbed through all pertinent portions of the case.    DISPO: PACU

## 2024-02-15 NOTE — PLAN OF CARE
Notified spouse by phone via prem Brush, in waiting room, [id number], procedure had begun   Authored by Resident/PA/NP

## 2024-02-15 NOTE — ANESTHESIA POSTPROCEDURE EVALUATION
Anesthesia Post Evaluation    Patient: Kiki Werner    Procedure(s) Performed: Procedure(s) (LRB):  BIOPSY, MUSCLE (N/A)    Final Anesthesia Type: general      Patient location during evaluation: PACU  Patient participation: Yes- Able to Participate  Level of consciousness: awake and alert  Post-procedure vital signs: reviewed and stable  Pain management: adequate  Airway patency: patent    PONV status at discharge: No PONV  Anesthetic complications: no      Cardiovascular status: hemodynamically stable  Respiratory status: unassisted, spontaneous ventilation and room air  Hydration status: euvolemic  Follow-up not needed.              Vitals Value Taken Time   /68 02/15/24 0900   Temp 36.3 °C (97.3 °F) 02/15/24 0900   Pulse 73 02/15/24 0912   Resp 10 02/15/24 0911   SpO2 100 % 02/15/24 0912   Vitals shown include unvalidated device data.      Event Time   Out of Recovery 09:13:44         Pain/Ania Score: Pain Rating Prior to Med Admin: 5 (2/15/2024  8:55 AM)  Ania Score: 10 (2/15/2024  9:00 AM)

## 2024-02-15 NOTE — DISCHARGE SUMMARY
[facility]  Discharge Note  Short Stay    Procedure(s) (LRB):  BIOPSY, MUSCLE (N/A)      OUTCOME: Patient tolerated treatment/procedure well without complication and is now ready for discharge.    DISPOSITION: Home or Self Care    FINAL DIAGNOSIS:  <principal problem not specified>    FOLLOWUP: In clinic    DISCHARGE INSTRUCTIONS:    Discharge Procedure Orders   Diet Adult Regular     Ice to affected area     Lifting restrictions   Order Comments: Please avoid lifting greater than 20 lb, straining, strenuous activity for two weeks.     Change dressing (specify)   Order Comments: Post-Operative Wound Care    A surgical glue has been placed over your incisions, please leave the glue in place and do not attempt to remove it.  It is ok to shower using mild soap and water over the incisions the day after your procedure. Pat dry your incisions. Do not soak in a bathtub or other body of water for 2 weeks or until cleared by your surgeon.     If you noticed redness, swelling, fever, increasing pain or significant drainage from your wound please call/message the office or the 24 hr nurse hotline after hours.     Notify your health care provider if you experience any of the following:  temperature >100.4     Notify your health care provider if you experience any of the following:  persistent nausea and vomiting or diarrhea     Notify your health care provider if you experience any of the following:  severe uncontrolled pain     Notify your health care provider if you experience any of the following:  redness, tenderness, or signs of infection (pain, swelling, redness, odor or green/yellow discharge around incision site)     Notify your health care provider if you experience any of the following:  worsening rash     Notify your health care provider if you experience any of the following:  increased confusion or weakness     Shower on day dressing removed (No bath)        TIME SPENT ON DISCHARGE: 10 minutes

## 2024-02-15 NOTE — TRANSFER OF CARE
"Anesthesia Transfer of Care Note    Patient: Kiki Werner    Procedure(s) Performed: Procedure(s) (LRB):  BIOPSY, MUSCLE (N/A)    Patient location: PACU    Anesthesia Type: general    Transport from OR: Transported from OR on room air with adequate spontaneous ventilation    Post pain: adequate analgesia    Post assessment: no apparent anesthetic complications    Post vital signs: stable    Level of consciousness: awake and alert    Nausea/Vomiting: no nausea/vomiting    Complications: none    Transfer of care protocol was followed      Last vitals: Visit Vitals  /71 (BP Location: Left arm, Patient Position: Sitting)   Pulse 71   Temp 36.8 °C (98.3 °F) (Oral)   Resp 16   Ht 5' 8" (1.727 m)   Wt 112.5 kg (248 lb)   LMP 02/12/2024   SpO2 99%   Breastfeeding No   BMI 37.71 kg/m²     "

## 2024-02-19 ENCOUNTER — TELEPHONE (OUTPATIENT)
Dept: NEUROLOGY | Facility: CLINIC | Age: 39
End: 2024-02-19
Payer: OTHER GOVERNMENT

## 2024-02-19 NOTE — TELEPHONE ENCOUNTER
----- Message from Emanuel Mendes sent at 2/19/2024 10:41 AM CST -----  ALFRDEO LEÓN calling regarding Patient Advice (message) [facility] is requesting  lab work history asking if it can be fax over to [phone]

## 2024-02-21 ENCOUNTER — TELEPHONE (OUTPATIENT)
Dept: NEUROLOGY | Facility: CLINIC | Age: 39
End: 2024-02-21
Payer: OTHER GOVERNMENT

## 2024-02-21 NOTE — TELEPHONE ENCOUNTER
----- Message from Mukesh Small, Patient Care Assistant sent at 2/20/2024  5:40 PM CST -----  Regarding: FW: STAFF FROM [facility] IS CALLING REGARDING PT'S LABS  Contact: Latoya    ----- Message -----  From: Edgar Castro  Sent: 2/20/2024  12:45 PM CST  To: Lionel Woodard Staff  Subject: STAFF FROM [facility] IS CALLING REGARDING PChen Klein calling on behalf on .    Latoya  [phone]

## 2024-02-23 ENCOUNTER — PATIENT MESSAGE (OUTPATIENT)
Dept: NEUROLOGY | Facility: CLINIC | Age: 39
End: 2024-02-23
Payer: OTHER GOVERNMENT

## 2024-03-01 ENCOUNTER — OFFICE VISIT (OUTPATIENT)
Dept: SURGERY | Facility: CLINIC | Age: 39
End: 2024-03-01
Payer: OTHER GOVERNMENT

## 2024-03-01 VITALS — DIASTOLIC BLOOD PRESSURE: 67 MMHG | SYSTOLIC BLOOD PRESSURE: 130 MMHG | TEMPERATURE: 98 F | HEART RATE: 88 BPM

## 2024-03-01 DIAGNOSIS — M62.81 MUSCLE WEAKNESS: Primary | ICD-10-CM

## 2024-03-01 PROCEDURE — 99213 OFFICE O/P EST LOW 20 MIN: CPT | Mod: PBBFAC,PN | Performed by: SURGERY

## 2024-03-01 PROCEDURE — 99999 PR PBB SHADOW E&M-EST. PATIENT-LVL III: CPT | Mod: PBBFAC,,, | Performed by: SURGERY

## 2024-03-01 PROCEDURE — 99024 POSTOP FOLLOW-UP VISIT: CPT | Mod: ,,, | Performed by: SURGERY

## 2024-03-01 NOTE — PROGRESS NOTES
GENERAL SURGERY  POST-OP PROGRESS NOTE    HPI: Kiki Werner is a 38 y.o. female status post  biopsy of left deltoid muscle. No incision well    VITALS:  Vitals:    03/01/24 1016   BP: 130/67   Pulse: 88   Temp: 98 °F (36.7 °C)       PHYSICAL EXAM:   Left lateral shoulder well-healed longitudinal incision affected postoperative changes such as firmness but no underlying fluid collection or sign hematoma or infection    PATHOLOGY:  SKELETAL MUSCLE, LEFT DELTOID, BIOPSY:   - MILD DENERVATION TYPE CHANGES.   - TYPE 2 MUSCLE FIBER ATROPHY.   - SEE COMMENT.     Comment: The denervation type changes consist of a few esterase positive    atrophic muscle fibers suggesting mild/minimal ongoing denervation and    mild patchy fiber type grouping indicating prior denervation and    subsequent successful reinnervation.     Type 2 muscle fiber atrophy is a nonspecific finding which may be seen    in a variety of settings, including disuse, cachexia, chronic steroid    exposure, and endocrine disease (thyroid).     No inflammation, morphologic evidence of acquired inflammatory myopathy    or vasculitis is seen in the tissue available for evaluation. No    morphologic or enzyme histochemical features diagnostic for metabolic    myopathy (lipid, glycogen, or mitochondria) are seen. Changes to    suggest the presence of a chronic dystrophic process are not present.     Of note: processes involving muscle may be patchy in nature, and    therefore subject to issues of sampling. Should the patient's signs or    symptoms persist and or worsen, imaging studies (MRI or ultrasound) may    be of utility in identifying a more involved muscle for resampling. In    addition, prior treatment with steroid and/or other immunomodulatory    agents may be ameliorate to a variable degree the pathologic    alterations required to establish a pathologic diagnosis of an    inflammatory or immune mediated process     ASSESSMENT & PLAN:  38 y.o. female s/p   biopsy of left deltoid muscle for weakness  - biopsy site is healed well with expected postoperative changes  - neurologist as reviewing pathology  - activity as tolerated from surgical standpoint  - return to clinic p.r.n.

## 2024-03-05 NOTE — TELEPHONE ENCOUNTER
I talked with the patient and updated her about the muscle biopsy results.  She may book virtual appointment for more detailed discussion about further management.  Currently we are at no clear reason for her symptoms.

## 2024-03-08 ENCOUNTER — TELEPHONE (OUTPATIENT)
Dept: NEUROLOGY | Facility: CLINIC | Age: 39
End: 2024-03-08
Payer: OTHER GOVERNMENT

## 2024-03-13 ENCOUNTER — PATIENT MESSAGE (OUTPATIENT)
Dept: NEUROLOGY | Facility: CLINIC | Age: 39
End: 2024-03-13
Payer: OTHER GOVERNMENT

## 2024-03-14 ENCOUNTER — LAB VISIT (OUTPATIENT)
Dept: LAB | Facility: HOSPITAL | Age: 39
End: 2024-03-14
Attending: PSYCHIATRY & NEUROLOGY
Payer: OTHER GOVERNMENT

## 2024-03-14 ENCOUNTER — OFFICE VISIT (OUTPATIENT)
Dept: NEUROLOGY | Facility: CLINIC | Age: 39
End: 2024-03-14
Payer: OTHER GOVERNMENT

## 2024-03-14 VITALS
WEIGHT: 247.94 LBS | BODY MASS INDEX: 37.58 KG/M2 | HEART RATE: 84 BPM | DIASTOLIC BLOOD PRESSURE: 80 MMHG | SYSTOLIC BLOOD PRESSURE: 116 MMHG | HEIGHT: 68 IN

## 2024-03-14 DIAGNOSIS — M62.81 MUSCLE WEAKNESS: Primary | ICD-10-CM

## 2024-03-14 DIAGNOSIS — M62.81 MUSCLE WEAKNESS: ICD-10-CM

## 2024-03-14 PROCEDURE — 99999 PR PBB SHADOW E&M-EST. PATIENT-LVL III: CPT | Mod: PBBFAC,,, | Performed by: PSYCHIATRY & NEUROLOGY

## 2024-03-14 PROCEDURE — 36415 COLL VENOUS BLD VENIPUNCTURE: CPT | Performed by: PSYCHIATRY & NEUROLOGY

## 2024-03-14 PROCEDURE — 99213 OFFICE O/P EST LOW 20 MIN: CPT | Mod: PBBFAC | Performed by: PSYCHIATRY & NEUROLOGY

## 2024-03-14 PROCEDURE — 99215 OFFICE O/P EST HI 40 MIN: CPT | Mod: S$PBB,,, | Performed by: PSYCHIATRY & NEUROLOGY

## 2024-03-14 PROCEDURE — 86235 NUCLEAR ANTIGEN ANTIBODY: CPT | Mod: 59 | Performed by: PSYCHIATRY & NEUROLOGY

## 2024-03-14 PROCEDURE — 83516 IMMUNOASSAY NONANTIBODY: CPT | Mod: 59 | Performed by: PSYCHIATRY & NEUROLOGY
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
## 2024-03-18 ENCOUNTER — TELEPHONE (OUTPATIENT)
Dept: NEUROLOGY | Facility: CLINIC | Age: 39
End: 2024-03-18

## 2024-04-02 ENCOUNTER — TELEPHONE (OUTPATIENT)
Dept: NEUROLOGY | Facility: CLINIC | Age: 39
End: 2024-04-02

## 2024-04-02 LAB
ANTI-PM/SCL AB: 46 UNITS
ANTI-SS-A 52 KD AB, IGG: <20 UNITS
EJ AB SER QL: NEGATIVE
ENA JO1 AB SER IA-ACNC: <20 UNITS
ENA SM+RNP AB SER IA-ACNC: <20 UNITS
FIBRILLARIN (U3 RNP): NEGATIVE
KU AB SER QL: NEGATIVE
MDA-5 (P140): <20 UNITS
MI2 AB SER QL: NEGATIVE
NXP-2 (P140): <20 UNITS
OJ AB SER QL: NEGATIVE
PL12 AB SER QL: NEGATIVE
PL7 AB SER QL: NEGATIVE
SRP AB SERPL QL: NEGATIVE
TIF1 GAMMA (P155/140): <20 UNITS
U2 SNRNP: NEGATIVE

## 2024-04-15 ENCOUNTER — PATIENT MESSAGE (OUTPATIENT)
Dept: NEUROLOGY | Facility: CLINIC | Age: 39
End: 2024-04-15
Payer: OTHER GOVERNMENT

## 2024-04-15 ENCOUNTER — TELEPHONE (OUTPATIENT)
Dept: NEUROLOGY | Facility: CLINIC | Age: 39
End: 2024-04-15
Payer: OTHER GOVERNMENT

## 2024-04-19 ENCOUNTER — TELEPHONE (OUTPATIENT)
Dept: NEUROLOGY | Facility: CLINIC | Age: 39
End: 2024-04-19
Payer: OTHER GOVERNMENT

## 2024-05-20 ENCOUNTER — TELEPHONE (OUTPATIENT)
Dept: NEUROLOGY | Facility: CLINIC | Age: 39
End: 2024-05-20
Payer: OTHER GOVERNMENT

## 2024-05-20 DIAGNOSIS — M62.81 MUSCLE WEAKNESS: Primary | ICD-10-CM

## 2024-05-20 NOTE — TELEPHONE ENCOUNTER
----- Message from Sarah Ugalde sent at 5/20/2024 12:20 PM CDT -----  Regarding: pt advice  Contact: [phone]  Pt returning missed call from staff. Pls call to discuss.

## 2024-05-30 ENCOUNTER — PATIENT MESSAGE (OUTPATIENT)
Dept: RHEUMATOLOGY | Facility: CLINIC | Age: 39
End: 2024-05-30
Payer: OTHER GOVERNMENT

## 2024-06-03 ENCOUNTER — TELEPHONE (OUTPATIENT)
Dept: NEUROLOGY | Facility: CLINIC | Age: 39
End: 2024-06-03
Payer: OTHER GOVERNMENT

## 2024-06-05 ENCOUNTER — PROCEDURE VISIT (OUTPATIENT)
Dept: NEUROLOGY | Facility: CLINIC | Age: 39
End: 2024-06-05
Payer: OTHER GOVERNMENT

## 2024-06-05 ENCOUNTER — PATIENT MESSAGE (OUTPATIENT)
Dept: NEUROLOGY | Facility: CLINIC | Age: 39
End: 2024-06-05

## 2024-06-05 VITALS
BODY MASS INDEX: 36.75 KG/M2 | WEIGHT: 242.5 LBS | HEIGHT: 68 IN | SYSTOLIC BLOOD PRESSURE: 104 MMHG | HEART RATE: 78 BPM | DIASTOLIC BLOOD PRESSURE: 72 MMHG

## 2024-06-05 DIAGNOSIS — M62.81 MUSCLE WEAKNESS: ICD-10-CM

## 2024-06-05 PROCEDURE — 95872 NDL EMG SINGLE FIBER ELTRD: CPT | Mod: 26,S$PBB,, | Performed by: PSYCHIATRY & NEUROLOGY

## 2024-06-05 PROCEDURE — 95872 NDL EMG SINGLE FIBER ELTRD: CPT | Mod: PBBFAC | Performed by: PSYCHIATRY & NEUROLOGY

## 2024-06-11 NOTE — PATIENT INSTRUCTIONS
Patient requested referral for local rheumatologist who is managing her mother with somewhat similar symptoms.  Referral placed.

## 2024-06-11 NOTE — PROCEDURES
Department of Neurology  Phone No: [phone], Fax: [phone]    Neurography & Electromyography Report      Full Name: Kiki Werner Gender: Female  Patient ID: [id number] YOB: 1985      Visit Date: 6/5/2024 3:15 PM  Age: 38 Years  Examining Physician: Vance Flowers MD  Height: 5 feet 8 inch  Weight: 242 lbs          Reason for Referral:    For evaluation of neuromuscular junction deficit.      Relevant medical diagnoses:    Myalgia.      Surgical procedures:    Muscle biopsy and tendon repair.            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  On examination, normal strength and deep tendon reflexes.  Mildly impaired pinprick sensation in lower extremities.    Technical Difficulties:    And.      Interpretation:     Normal study.  There is no electrodiagnostic evidence of neuromuscular junction deficit (e.g myasthenia gravis.  Patient with normal findings at single fiber EMG is unlikely to have myasthenia gravis while a positive single fiber has limited utility in confirming the diagnosis. (L. Padua et al. Clinical Neurophysiology, 2014-06-01, Volume 125, Issue 6, Pages 5671-8577)    Vance Flowers MD, FRCPE, FCPS, CHCQM  Neuromuscular Consultant  [facility]                   SFEMG - Volitional      Muscle   N Jitter MIPI NOVA MSD Block FR      µs µs µs µs  Hz   R Deltoid # Pairs 21          % Blocked      0     Mean  25 1105 25 40 - 10    Median  23 960 23 40 - 10    1.1 Pair 73 31 823 31 40 - 11    3.1 Pair 931 31 3668 39 43 - 10    4.1 Pair 72 20 2277 20 46 - 8    5.1 Pair 103 30 668 30 47 - 8    6.1 Pair 100 19 675 19 26 - 8    7.1 Pair 92 22 2725 22 28 - 11    8.1 Pair 61 39 919 39 46 - 12    9.1 Pair 86 24 1055 24 29 - 9    10.1 Pair 62 19 293 19 20 - 8    11.1 Pair 89 16 960 16 22 - 8    12.1 Pair 96 33 504 35 33 - 9    13.1 Pair 58 25 382 25 62 - 8     13.2 Pair 58 23 1711 23 45 - 8    14.1 Pair 102 15 767 15 25 - 11    15.1 Pair 021 24 6905 16 19 - 10    16.1 Pair 66 21 1619 21 25 - 15    17.1 Pair 60 25 357 25 80 - 10    21.1 Pair 103 16 754 16 17 - 11    22.1 Pair 76 20 1074 20 41 - 16    23.1 Pair 78 47 1217 47 62 - 8    24.1 Pair 83 34 1731 34 88 - 10       SFEMG - Volitional: R Deltoid

## 2024-07-03 NOTE — PROGRESS NOTES
Subjective:      Patient ID: Kiki Werner is a 38 y.o. female.    Chief Complaint: Establish Care    HPI    Rheumatologic History:     - Diagnosis/es: -  - Family History: States her mother has a similar muscle condition  - Positive serologies: Histone (2), PM/SCL, KAYLA 1:160 speckled  - Negative serologies:  dsDNA, SSA, SSB, Scl 70, Anca, RF, CCP, MyoMarker panel, NMO, HMGCR Ab, TPO, RF, CCP  - Infectious screening labs: Negative HCV, Hep B, quantiferon (4/2024)   - TPMT: normal metabolizer  - Pathology:   - Left deltoid (02/15/2024) mild denervation type change, type 2 muscle fiber atrophy  - Imaging:   - CT Chest (6/2022) no ILD   - MRI Humerus (11/2022) Mild subacromial subdeltoid bursitis. Mild Insertional tendinosis of infraspinatus.   - MRI femur (11/2022) Unremarkable   - Procedures:   - PFT (11/2022) normal spirometry   - TTE (9/2022) EF 65%, PASP 24   - EMG (6/11/2024) Normal study   - EMG (1/2021) normal study  - Previous Treatments:   - Plaquenil: ineffective   - Prednisone (max 60mg): ineffective  - Current Treatments:    - IVIG (6/7/24 - )    Interval History:   This is a 38 year old woman with fibromyalgia who was previously seen by Dr Conley on 9/6/2023 for a positive KAYLA 1:80 (repeat negative), +PM/Scl on myomarker panel. At one point, she had mildly elevated CPK (360 in 2021) and myalgias and was given a steroid course without improvement. [facility] showed mild right carpal tunnel. Humerus/Femur MRI showed normal muscle volume/signal, mild subacromial bursitis and insertion tendinosis of infraspinatus. CT chest did not show evidence of ILD. Out of concern for possible CTD she was started on HCQ and steroids without improvement in symptoms. She was seen by Dr Royal a few months ago and was found to have elevated CPK 1746 and elevated LFTs. He put her on prednisone 60mg which helped with her CPK but not her symptoms.  She reports that she has had weakness since 2014, but it has  progressed. She states that during the beginning of the day, she starts out strong and loses strength progressively after just a few seconds of physical activity. She also reports shortness of breath with exertion, myalgias, occasional swelling in her ankles and hands, photosensitivity, and intermittent oral ulcers. She is now on IVIG per Dr Ross and has had one infusion so far without change in her symptoms.     Objective:   /85   Pulse 87   Wt 109.8 kg (242 lb 1 oz)   BMI 36.81 kg/m²   Physical Exam   Constitutional: normal appearance.   HENT:   Head: Normocephalic and atraumatic.   Cardiovascular: Normal rate, regular rhythm and normal heart sounds.   Pulmonary/Chest: Effort normal and breath sounds normal.   Musculoskeletal:      Comments: No synovitis, dactylitis, enthesitis, effusions     Neurological: She is alert.   Skin: Skin is warm and dry.   Hypopigmented patches on the cheeks  Otherwise, no skin thickening, telangiectasias, calcinosis, psoriasiform lesions, lupoid lesions         Right Side Rheumatological Exam     Muscle Strength (0-5 scale):  Deltoid:  5  Biceps: 5/5   Triceps:  5  : 5/5   Iliopsoas: 3  Quadriceps:  4.6   Distal Lower Extremity: 4.6    Left Side Rheumatological Exam     Muscle Strength (0-5 scale):  Deltoid:  5  Biceps: 5/5   Triceps:  5  :  5/5   Iliopsoas: 4  Quadriceps:  4.6   Distal Lower Extremity: 4.6        No data to display     [facility] (4/2024)  CBC WNL  CMP WNL   ESR CRP WNL   CPK 1746     [facility] (6/18/24)   CMP WNL  ESR WNL   CRP 11  Complements not reduced     Labs (6/26/24)  CBC WBC, HGB, PLT WNL     Assessment:     1. Muscle weakness    2. Positive KAYLA (antinuclear antibody)    3. Abnormal laboratory test      This is a 38 year old woman with fibromyalgia, positive KAYLA 1:160 speckled, positive PM Scl, and weakness with two documented elevated CPK (360 in 2021 and 1746 in 4/2024). EMG x 2 did not show evidence of inflammatory muscle disease or  neuromuscular junction dysfunction. MRI of the Humerus/Femur MRI showed normal muscle volume/signal, mild subacromial bursitis and insertion tendinosis of infraspinatus but no inflammatory myositis. CT chest did not show evidence of ILD. She has been tried on HCQ without improvement of symptoms. She has been placed on 2 separate rounds prednisone (60mg/day) with improvement in CPK but no improvement in weakness.  She is now on IVIG per Dr Ross and has had one infusion so far without change in her symptoms. She states that during the beginning of the day, she starts out strong and loses strength progressively after just a few seconds of physical activity. The weakness has started to affect her activities of daily living. She also reports shortness of breath with exertion, myalgias, occasional swelling in her ankles and hands, photosensitivity, and intermittent oral ulcers. I am not opposed to her continuing IVIG for now but do not feel that there is objective evidence to support an inflammatory myopathy. 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 Agree with Neuro recommendations on referral to a larger center like [facility] or [facility].    Plan:     Problem List Items Addressed This Visit          Immunology/Multi System    Positive KAYLA (antinuclear antibody)     Other Visit Diagnoses       Muscle weakness    -  Primary    Relevant Orders    C-Reactive Protein    CBC Auto Differential    Creatinine, Serum    ALT (SGPT)    AST (SGOT)    Sedimentation rate    CK    Aldolase    Abnormal laboratory test              Follow up in 2 months    90 minutes of total time spent on the encounter, which includes face to face time and non-face to face time preparing to see the patient (eg, review of tests), Obtaining and/or reviewing separately obtained history, Documenting clinical information in the electronic or other health record, Independently interpreting  results (not separately reported) and communicating results to the patient/family/caregiver, or Care coordination (not separately reported).     This note was prepared with [employer] Direct voice recognition transcription software. Garbled syntax, mangled pronouns, and other bizarre constructions may be attributed to that software system       Sujata Hernandez M.D.  Rheumatology Dept  [place], [place]

## 2024-07-05 ENCOUNTER — OFFICE VISIT (OUTPATIENT)
Dept: RHEUMATOLOGY | Facility: CLINIC | Age: 39
End: 2024-07-05
Payer: OTHER GOVERNMENT

## 2024-07-05 VITALS
DIASTOLIC BLOOD PRESSURE: 85 MMHG | HEART RATE: 87 BPM | WEIGHT: 242.06 LBS | SYSTOLIC BLOOD PRESSURE: 118 MMHG | BODY MASS INDEX: 36.81 KG/M2

## 2024-07-05 DIAGNOSIS — M62.81 MUSCLE WEAKNESS: Primary | ICD-10-CM

## 2024-07-05 DIAGNOSIS — R89.9 ABNORMAL LABORATORY TEST: ICD-10-CM

## 2024-07-05 DIAGNOSIS — R76.8 POSITIVE ANA (ANTINUCLEAR ANTIBODY): ICD-10-CM

## 2024-07-05 PROCEDURE — 99213 OFFICE O/P EST LOW 20 MIN: CPT | Mod: PBBFAC,PO | Performed by: STUDENT IN AN ORGANIZED HEALTH CARE EDUCATION/TRAINING PROGRAM

## 2024-07-05 PROCEDURE — 99999 PR PBB SHADOW E&M-EST. PATIENT-LVL III: CPT | Mod: PBBFAC,,, | Performed by: STUDENT IN AN ORGANIZED HEALTH CARE EDUCATION/TRAINING PROGRAM

## 2024-07-05 RX ORDER — IMMUNE GLOBULIN (HUMAN) 10 G/100ML
INJECTION INTRAVENOUS; SUBCUTANEOUS
COMMUNITY
Start: 2024-06-07

## 2024-07-05 RX ORDER — SODIUM CHLORIDE 9 MG/ML
INJECTION, SOLUTION INTRAVENOUS
COMMUNITY
Start: 2024-06-07

## 2024-07-05 RX ORDER — IMMUNE GLOBULIN (HUMAN) 10 G/100ML
INJECTION INTRAVENOUS; SUBCUTANEOUS
COMMUNITY
Start: 2024-06-06

## 2024-07-05 ASSESSMENT — ROUTINE ASSESSMENT OF PATIENT INDEX DATA (RAPID3)
TOTAL RAPID3 SCORE: 8.66
MDHAQ FUNCTION SCORE: 3
PAIN SCORE: 7.5
FATIGUE SCORE: 2.2
PATIENT GLOBAL ASSESSMENT SCORE: 8.5
PSYCHOLOGICAL DISTRESS SCORE: 2.2
MDHAQ FUNCTION SCORE: 1.9

## 2024-07-09 ENCOUNTER — TELEPHONE (OUTPATIENT)
Dept: NEUROLOGY | Facility: CLINIC | Age: 39
End: 2024-07-09
Payer: OTHER GOVERNMENT

## 2024-07-09 DIAGNOSIS — M62.81 MUSCLE WEAKNESS: Primary | ICD-10-CM

## 2024-07-10 ENCOUNTER — LAB VISIT (OUTPATIENT)
Dept: LAB | Facility: HOSPITAL | Age: 39
End: 2024-07-10
Attending: PSYCHIATRY & NEUROLOGY
Payer: OTHER GOVERNMENT

## 2024-07-10 DIAGNOSIS — M62.81 MUSCLE WEAKNESS: ICD-10-CM

## 2024-07-10 PROCEDURE — 30000890 MAYO MISCELLANEOUS TEST (REFLEX): Performed by: PSYCHIATRY & NEUROLOGY

## 2024-07-10 PROCEDURE — 81405 MOPATH PROCEDURE LEVEL 6: CPT | Performed by: PSYCHIATRY & NEUROLOGY

## 2024-07-26 LAB — MAYO MISCELLANEOUS RESULT (REF): NORMAL

## 2024-09-02 ENCOUNTER — LAB VISIT (OUTPATIENT)
Dept: LAB | Facility: HOSPITAL | Age: 39
End: 2024-09-02
Attending: FAMILY MEDICINE
Payer: OTHER GOVERNMENT

## 2024-09-02 DIAGNOSIS — M62.81 MUSCLE WEAKNESS (GENERALIZED): Primary | ICD-10-CM

## 2024-09-02 LAB
ALT SERPL W/O P-5'-P-CCNC: 30 U/L (ref 10–44)
AST SERPL-CCNC: 50 U/L (ref 10–40)
BASOPHILS # BLD AUTO: 0.03 K/UL (ref 0–0.2)
BASOPHILS NFR BLD: 0.7 % (ref 0–1.9)
CK SERPL-CCNC: 114 U/L (ref 20–180)
CREAT SERPL-MCNC: 0.8 MG/DL (ref 0.5–1.4)
CRP SERPL-MCNC: 11.7 MG/L (ref 0–8.2)
DIFFERENTIAL METHOD BLD: ABNORMAL
EOSINOPHIL # BLD AUTO: 0.1 K/UL (ref 0–0.5)
EOSINOPHIL NFR BLD: 1.4 % (ref 0–8)
ERYTHROCYTE [DISTWIDTH] IN BLOOD BY AUTOMATED COUNT: 13.6 % (ref 11.5–14.5)
ERYTHROCYTE [SEDIMENTATION RATE] IN BLOOD BY WESTERGREN METHOD: 98 MM/HR (ref 0–20)
EST. GFR  (NO RACE VARIABLE): >60 ML/MIN/1.73 M^2
HCT VFR BLD AUTO: 38.3 % (ref 37–48.5)
HGB BLD-MCNC: 12.6 G/DL (ref 12–16)
IMM GRANULOCYTES # BLD AUTO: 0.01 K/UL (ref 0–0.04)
IMM GRANULOCYTES NFR BLD AUTO: 0.2 % (ref 0–0.5)
LYMPHOCYTES # BLD AUTO: 1.7 K/UL (ref 1–4.8)
LYMPHOCYTES NFR BLD: 38.9 % (ref 18–48)
MCH RBC QN AUTO: 31.3 PG (ref 27–31)
MCHC RBC AUTO-ENTMCNC: 32.9 G/DL (ref 32–36)
MCV RBC AUTO: 95 FL (ref 82–98)
MONOCYTES # BLD AUTO: 0.4 K/UL (ref 0.3–1)
MONOCYTES NFR BLD: 10 % (ref 4–15)
NEUTROPHILS # BLD AUTO: 2.1 K/UL (ref 1.8–7.7)
NEUTROPHILS NFR BLD: 48.8 % (ref 38–73)
NRBC BLD-RTO: 0 /100 WBC
PLATELET # BLD AUTO: 319 K/UL (ref 150–450)
PMV BLD AUTO: 10 FL (ref 9.2–12.9)
RBC # BLD AUTO: 4.03 M/UL (ref 4–5.4)
WBC # BLD AUTO: 4.29 K/UL (ref 3.9–12.7)

## 2024-09-02 PROCEDURE — 36415 COLL VENOUS BLD VENIPUNCTURE: CPT | Performed by: STUDENT IN AN ORGANIZED HEALTH CARE EDUCATION/TRAINING PROGRAM

## 2024-09-02 PROCEDURE — 85025 COMPLETE CBC W/AUTO DIFF WBC: CPT | Performed by: STUDENT IN AN ORGANIZED HEALTH CARE EDUCATION/TRAINING PROGRAM

## 2024-09-02 PROCEDURE — 84460 ALANINE AMINO (ALT) (SGPT): CPT | Performed by: STUDENT IN AN ORGANIZED HEALTH CARE EDUCATION/TRAINING PROGRAM

## 2024-09-02 PROCEDURE — 86140 C-REACTIVE PROTEIN: CPT | Performed by: STUDENT IN AN ORGANIZED HEALTH CARE EDUCATION/TRAINING PROGRAM

## 2024-09-02 PROCEDURE — 82550 ASSAY OF CK (CPK): CPT | Performed by: STUDENT IN AN ORGANIZED HEALTH CARE EDUCATION/TRAINING PROGRAM

## 2024-09-02 PROCEDURE — 84450 TRANSFERASE (AST) (SGOT): CPT | Performed by: STUDENT IN AN ORGANIZED HEALTH CARE EDUCATION/TRAINING PROGRAM

## 2024-09-02 PROCEDURE — 82565 ASSAY OF CREATININE: CPT | Performed by: STUDENT IN AN ORGANIZED HEALTH CARE EDUCATION/TRAINING PROGRAM

## 2024-09-02 PROCEDURE — 85651 RBC SED RATE NONAUTOMATED: CPT | Performed by: STUDENT IN AN ORGANIZED HEALTH CARE EDUCATION/TRAINING PROGRAM

## 2024-09-02 PROCEDURE — 82085 ASSAY OF ALDOLASE: CPT | Performed by: STUDENT IN AN ORGANIZED HEALTH CARE EDUCATION/TRAINING PROGRAM

## 2024-09-04 NOTE — PROGRESS NOTES
Subjective:      Patient ID: Kiki Werner is a 39 y.o. female.    Chief Complaint: Disease Management    HPI    Rheumatologic History:      - Diagnosis/es: -  - Family History: States her mother has a similar muscle condition  - Positive serologies: Histone (2), PM/SCL, KAYLA 1:160 speckled  - Negative serologies:  dsDNA, SSA, SSB, Scl 70, Anca, RF, CCP, MyoMarker panel, NMO, HMGCR Ab, TPO, RF, CCP  - Infectious screening labs: Negative HCV, Hep B, quantiferon (4/2024)   - TPMT: normal metabolizer  - Pathology:   - Left deltoid (02/15/2024) mild denervation type change, type 2 muscle fiber atrophy  - Imaging:              - CT Chest (6/2022) no ILD              - MRI Humerus (11/2022) Mild subacromial subdeltoid bursitis. Mild Insertional tendinosis of infraspinatus.              - MRI femur (11/2022) Unremarkable   - Procedures:              - PFT (11/2022) normal spirometry              - TTE (9/2022) EF 65%, PASP 24              - EMG (6/11/2024) Normal study              - EMG (1/2021) normal study  - Previous Treatments:              - Plaquenil: ineffective              - Prednisone (max 60mg): ineffective  - Current Treatments:               - IVIG (6/7/24 - )     Interval History:   She still reports brain fog, fatigue, widespread pain, and weakness that occurs after she has been walking/exerting herself. Her symptoms have been debilitating and she expresses frustration at the lack of improvement on IVIG, and the lack of answers.     Objective:   /85   Pulse 76   Wt 109.5 kg (241 lb 6.5 oz)   BMI 36.71 kg/m²   Physical Exam   Constitutional: normal appearance.   HENT:   Head: Normocephalic and atraumatic.   Pulmonary/Chest: Effort normal.   Musculoskeletal:      Comments: No synovitis, dactylitis, enthesitis, effusions     Neurological: She is alert.   Skin: Skin is warm and dry. No rash noted.   No skin thickening, telangiectasias, calcinosis, psoriasiform lesions, lupoid lesions         Right Side  Rheumatological Exam     Muscle Strength (0-5 scale):  Deltoid:  5  Biceps: 5/5   Triceps:  5  : 5/5   Iliopsoas: 5  Quadriceps:  5   Distal Lower Extremity: 5    Left Side Rheumatological Exam     Muscle Strength (0-5 scale):  Deltoid:  5  Biceps: 5/5   Triceps:  5  :  5/5   Iliopsoas: 5  Quadriceps:  5   Distal Lower Extremity: 5        No data to display    Labs (9/2/2024)  CBC WBC, HGB, PLT WNL  AST 50, CR, ALT WNL   ESR 98 <- 75 <- 61  CRP 11.7 <- 17.5   CPK WNL  Aldolase WNL     Assessment:     1. Muscle weakness    2. Positive KAYLA (antinuclear antibody)    3. Fibromyalgia        This is a 39 year old woman with fibromyalgia, positive KAYLA 1:160 speckled (not meeting criteria for lupus), positive PM Scl, and weakness with two documented elevated CPK (360 in 2021 and 1746 in 4/2024). EMG x 2 did not show evidence of inflammatory muscle disease or neuromuscular junction dysfunction. MRI of the Humerus/Femur MRI showed normal muscle volume/signal, mild subacromial bursitis and insertion tendinosis of infraspinatus but no inflammatory myositis. CT chest did not show evidence of ILD. She has been tried on HCQ without improvement of symptoms. She has been placed on 2 separate rounds prednisone (60mg/day) with improvement in CPK but no improvement in weakness.  She is on IVIG per Dr Ross and has had 3 infusions so far without change in her symptoms. Her ESR is markedly elevated out of proportion to CRP but may be elevated due to IVIG infusions. Her physical examinations have been normal. She states that during the beginning of the day, she starts out strong and loses strength progressively after just a few seconds of physical activity. The weakness has started to affect her activities of daily living. She also reports shortness of breath with exertion, myalgias, occasional swelling in her ankles and hands, photosensitivity, and intermittent oral ulcers. I am not opposed to her continuing IVIG for now but do  not feel that there is objective evidence to support an inflammatory myopathy. I considered a trial of MTX but her AST is elevated, and am not convinced that this is a myositis. 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 Agree with Neuro recommendations on referral to a larger center like [facility] or [facility]. I attempted to call to follow up on the referral, but voicemail indicates that the referral coordinator is out of office until 9/9.     Plan:     Problem List Items Addressed This Visit          Immunology/Multi System    Positive KAYLA (antinuclear antibody)       Orthopedic    Muscle weakness - Primary     Other Visit Diagnoses       Fibromyalgia              Follow up PRN    60 minutes of total time spent on the encounter, which includes face to face time and non-face to face time preparing to see the patient (eg, review of tests), Obtaining and/or reviewing separately obtained history, Documenting clinical information in the electronic or other health record, Independently interpreting results (not separately reported) and communicating results to the patient/family/caregiver, or Care coordination (not separately reported).     This note was prepared with [employer] Direct voice recognition transcription software. Garbled syntax, mangled pronouns, and other bizarre constructions may be attributed to that software system       Sujata Hernandez M.D.  Rheumatology Dept  [place], [place]

## 2024-09-05 ENCOUNTER — OFFICE VISIT (OUTPATIENT)
Dept: RHEUMATOLOGY | Facility: CLINIC | Age: 39
End: 2024-09-05
Payer: OTHER GOVERNMENT

## 2024-09-05 VITALS
WEIGHT: 241.38 LBS | HEART RATE: 76 BPM | DIASTOLIC BLOOD PRESSURE: 85 MMHG | BODY MASS INDEX: 36.71 KG/M2 | SYSTOLIC BLOOD PRESSURE: 136 MMHG

## 2024-09-05 DIAGNOSIS — M79.7 FIBROMYALGIA: ICD-10-CM

## 2024-09-05 DIAGNOSIS — M62.81 MUSCLE WEAKNESS: Primary | ICD-10-CM

## 2024-09-05 DIAGNOSIS — R76.8 POSITIVE ANA (ANTINUCLEAR ANTIBODY): ICD-10-CM

## 2024-09-05 LAB — ALDOLASE SERPL-CCNC: 3.4 U/L

## 2024-09-05 PROCEDURE — 99213 OFFICE O/P EST LOW 20 MIN: CPT | Mod: PBBFAC,PO | Performed by: STUDENT IN AN ORGANIZED HEALTH CARE EDUCATION/TRAINING PROGRAM

## 2024-09-05 PROCEDURE — 99215 OFFICE O/P EST HI 40 MIN: CPT | Mod: S$PBB,,, | Performed by: STUDENT IN AN ORGANIZED HEALTH CARE EDUCATION/TRAINING PROGRAM

## 2024-09-05 PROCEDURE — 99999 PR PBB SHADOW E&M-EST. PATIENT-LVL III: CPT | Mod: PBBFAC,,, | Performed by: STUDENT IN AN ORGANIZED HEALTH CARE EDUCATION/TRAINING PROGRAM

## 2024-09-05 ASSESSMENT — ROUTINE ASSESSMENT OF PATIENT INDEX DATA (RAPID3)
PSYCHOLOGICAL DISTRESS SCORE: 4.4
PAIN SCORE: 7.5
MDHAQ FUNCTION SCORE: 2.1
FATIGUE SCORE: 2.2
PATIENT GLOBAL ASSESSMENT SCORE: 7.5
TOTAL RAPID3 SCORE: 7.33

## 2024-11-13 ENCOUNTER — TELEPHONE (OUTPATIENT)
Dept: RHEUMATOLOGY | Facility: CLINIC | Age: 39
End: 2024-11-13
Payer: OTHER GOVERNMENT

## 2024-11-13 ENCOUNTER — PATIENT MESSAGE (OUTPATIENT)
Dept: RHEUMATOLOGY | Facility: CLINIC | Age: 39
End: 2024-11-13
Payer: OTHER GOVERNMENT

## 2024-11-13 NOTE — TELEPHONE ENCOUNTER
I called [facility] to follow up on the patient's referral. I spoke with Belle who asked me to send some additional documentation including EMG results to fax number . She indicated that the patient should hear back within 7-10 business days and if she does not she should call back at .

## 2025-08-01 ENCOUNTER — TELEPHONE (OUTPATIENT)
Dept: RHEUMATOLOGY | Facility: CLINIC | Age: 40
End: 2025-08-01
Payer: OTHER GOVERNMENT

## 2025-08-01 NOTE — TELEPHONE ENCOUNTER
Spoke to the pt and informed her that she is not going to be able to see Dr Lynn due to she is not able to see new pts. I did offer her to come in and see Dr Palumbo, she declined and wanted the appt to be canceled. States understanding

## (undated) DEVICE — SOL NACL IRR 1000ML BTL

## (undated) DEVICE — GLOVE BIOGEL SKINSENSE PI 7.0

## (undated) DEVICE — SUT MCRYL PLUS 4-0 PS2 27IN

## (undated) DEVICE — GLOVE BIOGEL PIMICRO INDIC 7.5

## (undated) DEVICE — ELECTRODE REM PLYHSV RETURN 9

## (undated) DEVICE — SUT COATED VICRYL 3-0 1X27

## (undated) DEVICE — NDL ECLIPSE SAFETY 23G 1.5IN

## (undated) DEVICE — TRAY MINOR SMH

## (undated) DEVICE — DRAPE T TRNSVRS LAP 102X78X121

## (undated) DEVICE — ADHESIVE DERMABOND ADVANCED

## (undated) DEVICE — SUT 4-0 VICRYL / SH